# Patient Record
Sex: FEMALE | Race: BLACK OR AFRICAN AMERICAN | Employment: FULL TIME | URBAN - METROPOLITAN AREA
[De-identification: names, ages, dates, MRNs, and addresses within clinical notes are randomized per-mention and may not be internally consistent; named-entity substitution may affect disease eponyms.]

---

## 2017-01-11 RX ORDER — LISINOPRIL AND HYDROCHLOROTHIAZIDE 12.5; 2 MG/1; MG/1
TABLET ORAL
Qty: 90 TAB | Refills: 1 | Status: SHIPPED | OUTPATIENT
Start: 2017-01-11 | End: 2021-01-08

## 2017-01-25 DIAGNOSIS — E03.9 ACQUIRED HYPOTHYROIDISM: ICD-10-CM

## 2017-02-10 RX ORDER — ESTROGENS, CONJUGATED 0.62 MG/1
TABLET, FILM COATED ORAL
Qty: 30 TAB | Refills: 0 | Status: SHIPPED | OUTPATIENT
Start: 2017-02-10 | End: 2017-03-11 | Stop reason: SDUPTHER

## 2017-03-01 RX ORDER — CARVEDILOL PHOSPHATE 40 MG/1
CAPSULE, EXTENDED RELEASE ORAL
Qty: 30 CAP | Refills: 4 | Status: SHIPPED | OUTPATIENT
Start: 2017-03-01 | End: 2017-08-09 | Stop reason: SDUPTHER

## 2017-03-16 ENCOUNTER — OFFICE VISIT (OUTPATIENT)
Dept: INTERNAL MEDICINE CLINIC | Age: 57
End: 2017-03-16

## 2017-03-16 VITALS
SYSTOLIC BLOOD PRESSURE: 108 MMHG | TEMPERATURE: 97.8 F | HEART RATE: 64 BPM | DIASTOLIC BLOOD PRESSURE: 60 MMHG | HEIGHT: 63 IN | WEIGHT: 219 LBS | BODY MASS INDEX: 38.8 KG/M2 | OXYGEN SATURATION: 96 %

## 2017-03-16 DIAGNOSIS — J40 BRONCHITIS: Primary | ICD-10-CM

## 2017-03-16 RX ORDER — AZITHROMYCIN 250 MG/1
TABLET, FILM COATED ORAL
Qty: 6 TAB | Refills: 0 | Status: SHIPPED | OUTPATIENT
Start: 2017-03-16 | End: 2017-04-18 | Stop reason: ALTCHOICE

## 2017-03-16 RX ORDER — PROMETHAZINE HYDROCHLORIDE AND CODEINE PHOSPHATE 6.25; 1 MG/5ML; MG/5ML
SOLUTION ORAL
Qty: 120 ML | Refills: 0 | Status: SHIPPED | OUTPATIENT
Start: 2017-03-16 | End: 2018-05-17

## 2017-03-16 RX ORDER — HYDROCODONE POLISTIREX AND CHLORPHENIRAMINE POLISTIREX 10; 8 MG/5ML; MG/5ML
5 SUSPENSION, EXTENDED RELEASE ORAL
Qty: 120 ML | Refills: 0 | Status: SHIPPED | OUTPATIENT
Start: 2017-03-16 | End: 2017-04-18 | Stop reason: ALTCHOICE

## 2017-03-16 NOTE — PROGRESS NOTES
1. Have you been to the ER, urgent care clinic or hospitalized since your last visit? NO.     2. Have you seen or consulted any other health care providers outside of the [address] [place] [address] since your last visit (Include any pap smears or colon screening)? NO      Do you have an Advanced Directive? NO    Would you like information on Advanced Directives?  YES

## 2017-03-16 NOTE — PROGRESS NOTES
62 y.o. BLACK OR  female who presents for evaluation. She's had uri sx about a week, everyone around her especially at work has been sick w uri sx. Describes chest congestion, minimally prod cough, minimal wheezing without overt sob. No fever, ear pain, sore throat, facial pain. Been using ricola and herbal tea w minimal help    Current Outpatient Prescriptions on File Prior to Visit   Medication Sig Dispense Refill    PREMARIN 0.625 mg tablet take 1 tablet by mouth once daily 90 Tab 3    COREG CR 40 mg CR capsule TAKE 1 CAPSULES DAILY WITH BREAKFAST 30 Cap 4    lisinopril-hydroCHLOROthiazide (PRINZIDE, ZESTORETIC) 20-12.5 mg per tablet take 1 tablet by mouth every morning for blood pressure 90 Tab 1    amLODIPine (NORVASC) 10 mg tablet take 1 tablet by mouth once daily 90 Tab 3    sertraline (ZOLOFT) 100 mg tablet take 1 tablet by mouth daily 30 Tab 10    levothyroxine (SYNTHROID) 100 mcg tablet Take 100 mcg by mouth Daily (before breakfast).  Cholecalciferol, Vitamin D3, (VITAMIN D) 5,000 unit Tab Take  by mouth. No current facility-administered medications on file prior to visit. Current Outpatient Prescriptions   Medication Sig    chlorpheniramine-HYDROcodone (TUSSIONEX) 10-8 mg/5 mL suspension Take 5 mL by mouth every twelve (12) hours as needed for Cough. Max Daily Amount: 10 mL.  azithromycin (ZITHROMAX) 250 mg tablet Take 2 tablets today, then take 1 tablet daily    PREMARIN 0.625 mg tablet take 1 tablet by mouth once daily    COREG CR 40 mg CR capsule TAKE 1 CAPSULES DAILY WITH BREAKFAST    lisinopril-hydroCHLOROthiazide (PRINZIDE, ZESTORETIC) 20-12.5 mg per tablet take 1 tablet by mouth every morning for blood pressure    amLODIPine (NORVASC) 10 mg tablet take 1 tablet by mouth once daily    sertraline (ZOLOFT) 100 mg tablet take 1 tablet by mouth daily    levothyroxine (SYNTHROID) 100 mcg tablet Take 100 mcg by mouth Daily (before breakfast).     Cholecalciferol, Vitamin D3, (VITAMIN D) 5,000 unit Tab Take  by mouth. No current facility-administered medications for this visit. No Known Allergies  Visit Vitals    /60    Pulse 64    Temp 97.8 °F (36.6 °C) (Oral)    Ht 5' 3\" (1.6 m)    Wt 219 lb (99.3 kg)    SpO2 96%    BMI 38.79 kg/m2   tm wnl, sinuses nt, op benign, no nodes. Lungs dec bs but clear, no wheezing or rales, heart showed rrr. Assessment and plan:  1. Bronchitis. zpak and tussionex given, she will use coricidin, call if no better or worsening complaints      Above conditions discussed at length and patient vocalized understanding.   All questions answered to patient satifaction

## 2017-04-17 ENCOUNTER — TELEPHONE (OUTPATIENT)
Dept: VASCULAR SURGERY | Age: 57
End: 2017-04-17

## 2017-04-17 NOTE — TELEPHONE ENCOUNTER
Pt cld, she is having trouble with her left leg swelling and wants to come in for a visit but if she needs tests she wants to go ahead and do that first.  Can we order test first, she is a current patient.

## 2017-04-18 ENCOUNTER — OFFICE VISIT (OUTPATIENT)
Dept: INTERNAL MEDICINE CLINIC | Age: 57
End: 2017-04-18

## 2017-04-18 VITALS
DIASTOLIC BLOOD PRESSURE: 66 MMHG | BODY MASS INDEX: 38.45 KG/M2 | WEIGHT: 217 LBS | OXYGEN SATURATION: 99 % | SYSTOLIC BLOOD PRESSURE: 110 MMHG | HEIGHT: 63 IN | HEART RATE: 68 BPM | TEMPERATURE: 98.3 F

## 2017-04-18 DIAGNOSIS — K62.89 RECTAL PAIN: Primary | ICD-10-CM

## 2017-04-18 DIAGNOSIS — I10 ESSENTIAL HYPERTENSION: ICD-10-CM

## 2017-04-18 PROBLEM — E66.9 OBESITY (BMI 30-39.9): Status: ACTIVE | Noted: 2017-04-18

## 2017-04-19 NOTE — PROGRESS NOTES
62 y.o. BLACK OR  female who presents for evaluation. No cardiovascular complaints. Remains active with her exercise and bp has been controlled    Here primarily c/o perirectal pain which she describes as almost a spasm. Interestingly, it only happens at night. Been ongoing about 5 mos or so, occurs maybe 1-2x/month. No associated n/v/change in bh, bleeding, she feels that it's inside the rectum. BMs do not help, they usually resolve within an hour or so. She had colo possibly 2010 from Dr Shobha Valencia group which was negative. Reports no associated gu or gyn complaints, she is s/p total hyst and has not seen gyn in several years    Past Medical History:   Diagnosis Date    Allergic rhinitis     Anxiety     WADE-7 was 6/21    Chronic venous hypertension without complications 5/45/4113    Depression     1980s treated w zoloft    FHx: heart disease     Grave's disease 2003    and hyperthyroidism Dr. Trent Lawson Hypertension     neg w/u secondary causes 3/15 Dr Christelle Patel    Hypovitaminosis D     Morbid obesity ([address]     peak weight 225 lbs, bmi 39.1 from 2/15    Subacromial bursitis     Venous reflux     12/14 EMILY Carrizales     Past Surgical History:   Procedure Laterality Date    CARDIAC SURG PROCEDURE UNLIST  1/14    negative thallium ef 65%    HX COLONOSCOPY      negative Dr Tenzin Singh 2012?     HX CHADWICK AND BSO      NEUROLOGICAL PROCEDURE UNLISTED  3/15    ct head negative    VASCULAR SURGERY PROCEDURE UNLIST  2/14    negative BLE venous doppler     Social History     Social History    Marital status:      Spouse name: N/A    Number of children: 3    Years of education: N/A     Occupational History    works at [employer] [address] History Main Topics    Smoking status: Never Smoker    Smokeless tobacco: Never Used    Alcohol use No    Drug use: No    Sexual activity: Not on file     Other Topics Concern    Not on file     Social History Narrative      Current Outpatient Prescriptions Medication Sig    promethazine-codeine (PHENERGAN WITH CODEINE) 6.25-10 mg/5 mL syrup TAKE 5 MLS BY MOUTH FOUR TIMES A DAY    PREMARIN 0.625 mg tablet take 1 tablet by mouth once daily    COREG CR 40 mg CR capsule TAKE 1 CAPSULES DAILY WITH BREAKFAST    lisinopril-hydroCHLOROthiazide (PRINZIDE, ZESTORETIC) 20-12.5 mg per tablet take 1 tablet by mouth every morning for blood pressure    amLODIPine (NORVASC) 10 mg tablet take 1 tablet by mouth once daily    sertraline (ZOLOFT) 100 mg tablet take 1 tablet by mouth daily    Cholecalciferol, Vitamin D3, (VITAMIN D) 5,000 unit Tab Take  by mouth. No current facility-administered medications for this visit. No Known Allergies    REVIEW OF SYSTEMS: gyn 2015, Mammo 2011? [place] 2011? Dr Justin Patel  Ophtho - no vision change or eye pain  Oral - no mouth pain, tongue or tooth problems  Ears - no hearing loss, ear pain, fullness, no swallowing problems  Resp - no wheezing, chronic coughing, dyspnea  GI - no heartburn, nausea, vomiting, change in bowel habits, bleeding, hemorrhoids  Urinary - no dysuria, hematuria, flank pain, urgency, frequency  Ortho - no swelling, dec ROM, myalgias  Derm - no nail abnormalities, rashes, lesions of note, hair loss  Psych - denies any anxiety or depression symptoms, no hallucinations or violent ideation  Constitutional - no wt loss, night sweats, unexplained fevers  Neuro - no focal weakness, numbness, paresthesias, incoordination, ataxia, involuntary movements  Endo - no polyuria, polydipsia, nocturia, hot flashes    Visit Vitals    /66    Pulse 68    Temp 98.3 °F (36.8 °C) (Oral)    Ht 5' 3\" (1.6 m)    Wt 217 lb (98.4 kg)    SpO2 99%    BMI 38.44 kg/m2   A&O x3  Affect is appropriate. Mood stable  No apparent distress  Anicteric, no JVD, adenopathy or thyromegaly. No carotid bruits or radiated murmur  Lungs clear to auscultation, no wheezes or rales  Heart showed regular rate and rhythm.  No murmur, rubs, gallops  Abdomen soft nontender, no hepatosplenomegaly or masses. Declined rectal exam as she will be seeing the specialist  Extremities without edema. Pulses 1-2+ symmetrically. LABS  From 11/07 showed gluc 92, cr 0.90,  alt 30, chol 164, tg 30, hdl 51, ldl-c 107, wbc 4.5, hb 12.7, plt 190, tsh 0.50  From 2/10 showed                    wbc 4.7, hb 13.7, plt 194  From 6/10 showed   gluc 88, cr 0.80,   alt 37, chol 168, tg 40, hdl 65, ldl-c 95  From 8/10 showed                                 tsh 0.43, vit d 27  From 3/12 showed                          ua neg  From 10/14 showed                          uric 6.8  From 3/15 showed   gluc 90, cr 0.80, gfr>60, alt 14, chol 174, tg 41, hdl 72, ldl-c 94,  wbc 4.4, hb 11.4, plt 218, tsh 0.27  From 3/15 showed                ck/trop neg  From 3/15 showed   gluc 99, cr 0.70, gfr>60, alt 12,           meta neg, luis/renin nl, serum catechols neg, urine catechols neg  From 12/15 showed gluc 91, cr 0.70, gfr>60, alt 18, chol 176, tg 45, hdl 72, ldl-c 96,  wbc 4.5, hb 12.9, plt 201, tsh 0.38, ua neg    Patient Active Problem List   Diagnosis Code    Varicose veins of lower extremities with other complications E19.290    Anxiety F41.9    Essential hypertension I10    Leg edema, left R60.0    Acquired hypothyroidism E03.9    Morbid obesity with BMI of 40.0-44.9, adult (HCC) E66.01, Z68.41     Assessment and plan:  1. HTN. Continue current  2. Thyroid. F/U Dr Suni Winslow   3. Varicosities. F/U EMILY Lopez  4. Psychiatric. Continue current regimen. 5. Perirectal pain/spasm? Will sched Dr Tru Petit for eval        RTC 1/17    Above conditions discussed at length and patient vocalized understanding.   All questions answered to patient satifaction

## 2017-05-02 ENCOUNTER — OFFICE VISIT (OUTPATIENT)
Dept: VASCULAR SURGERY | Age: 57
End: 2017-05-02

## 2017-05-02 VITALS
HEART RATE: 68 BPM | BODY MASS INDEX: 38.45 KG/M2 | WEIGHT: 217 LBS | DIASTOLIC BLOOD PRESSURE: 70 MMHG | SYSTOLIC BLOOD PRESSURE: 118 MMHG | RESPIRATION RATE: 18 BRPM | HEIGHT: 63 IN

## 2017-05-02 DIAGNOSIS — I83.893 VARICOSE VEINS OF LOWER EXTREMITIES WITH OTHER COMPLICATIONS: ICD-10-CM

## 2017-05-02 DIAGNOSIS — R60.0 LEG EDEMA, LEFT: ICD-10-CM

## 2017-05-02 DIAGNOSIS — R60.0 LEG EDEMA, LEFT: Primary | ICD-10-CM

## 2017-05-02 NOTE — PROCEDURES
[facility]   *** FINAL REPORT ***    Name: Yo Osorio  MRN: [id number]       Outpatient  : 08 Mar 1960  HIS Order #: [id number]  [id number] [facility] Visit #: [id number]  Date: 02 May 2017    TYPE OF TEST: Peripheral Venous Testing    REASON FOR TEST  Pain in limb, Limb swelling    Right Leg:-  Deep venous thrombosis:           Not examined  Superficial venous thrombosis:    Not examined  Deep venous insufficiency:        Not examined  Superficial venous insufficiency: Not examined    Left Leg:-  Deep venous thrombosis:           No  Superficial venous thrombosis:    No  Deep venous insufficiency:        No  Superficial venous insufficiency: No    Vein Mapping:    Diam.   Depth  (mm)    Right Great Saphenous Vein:    High Thigh:    Mid Thigh:    Low Thigh:    Knee:    High Calf:    Low Calf: Ankle:    Right Small Saphenous Vein:    SPJ:    Mid Calf: Ankle:    Giacomini:  Accessory saph.:  Maier :  Muse :    Left Great Saphenous Vein:    High Thigh:    Mid Thigh:    Low Thigh:    Knee:    High Calf:    Low Calf: Ankle:    Left Small Saphenous Vein:    SPJ:    Mid Calf: Ankle:    Giacomini:  Accessory saph.:  Maier :  Muse :      INTERPRETATION/FINDINGS  Duplex images were obtained using 2-D gray scale, color flow and  spectral doppler analysis. 1. No evidence of deep venous thrombosis in the left common femoral,  femoral, profunda, popliteal, posterior tibial and peroneal veins in  the lower extremity. 2. Patent contralateral femoral vein without evidence of thrombus. 3. History of left GSV ablation on 02/24/15, unable to identify main  GSV in the left thigh and knee levels, T0. No significant superficial   venous reflux noted in the remaining levels of the left GSV. No  reflux detected in the left AASV in reverse trendelenburg. 4. No reflux detected in the left sapheno-popliteal junction of the  left lesser saphenous vein in reverse trendelenburg.   No reflux  detected in the remaining levels of the left LSV. 5. Competent left lower leg  noted measuring 1.9 mm.  6. Triphasic doppler signals in the tibial vessel at rest.  No significant changes when compare to the previous exam.    ADDITIONAL COMMENTS    I have personally reviewed the data relevant to the interpretation of  this  study. TECHNOLOGIST: Ishmael Romero RVT, KHADIJAH  Signed: 05/02/2017 11:39 AM    PHYSICIAN: Pranav Stein D.O.   Signed: 05/02/2017 01:19 PM

## 2017-05-02 NOTE — MR AVS SNAPSHOT
Visit Information Date & Time Provider Department Dept. Phone Encounter #  
 5/2/2017  9:00 AM Oracio Champagne MD [facility] [phone] [id number] Follow-up Instructions Return in about 2 days (around 5/4/2017). Follow-up and Disposition History Your Appointments 5/2/2017 11:00 AM  
PROCEDURE with [facility] IMAGING 2 [facility] ([facility]) Appt Note: l reflux juan same day [address] [facility] [address] [facility]  
[phone] [address] [facility] [address] [facility]  
  
    
 5/4/2017  1:45 PM  
[facility] [facility] Appt Note: NP  
 [address] [place] [address]  
[address] [facility] [address] Upcoming Health Maintenance Date Due Hepatitis C Screening 1960 DTaP/Tdap/Td series (1 - Tdap) 3/8/1981 BREAST CANCER SCRN MAMMOGRAM 3/8/2010 INFLUENZA AGE 9 TO ADULT 8/1/2017 COLONOSCOPY 7/2/2022 Allergies as of 5/2/2017  Review Complete On: 5/2/2017 By: Oracio Champagne MD  
 No Known Allergies Current Immunizations  Reviewed on 7/12/2013 No immunizations on file. Not reviewed this visit You Were Diagnosed With   
  
 Codes Comments Leg edema, left    -  Primary ICD-10-CM: R60.0 ICD-9-CM: 465. 3 Varicose veins of lower extremities with other complications     IQM-11-IR: P19.875 ICD-9-CM: 454.8 Vitals BP Pulse Resp Height(growth percentile) Weight(growth percentile) BMI  
 118/70 (BP 1 Location: Right arm, BP Patient Position: Sitting) 68 18 5' 3\" (1.6 m) 217 lb (98.4 kg) 38.44 kg/m2 OB Status Smoking Status Hysterectomy Never Smoker BMI and BSA Data  Body Mass Index Body Surface Area  
 38.44 kg/m 2 2.09 m 2  
  
  
 Preferred Pharmacy Pharmacy Name Phone [id number], [address] [phone] Your Updated Medication List  
  
   
This list is accurate as of: 5/2/17  9:37 AM.  Always use your most recent med list. amLODIPine 10 mg tablet Commonly known as:  NORVASC  
take 1 tablet by mouth once daily COREG CR 40 mg CR capsule Generic drug:  carvedilol TAKE 1 CAPSULES DAILY WITH BREAKFAST  
  
 lisinopril-hydroCHLOROthiazide 20-12.5 mg per tablet Commonly known as:  PRINZIDE, ZESTORETIC  
take 1 tablet by mouth every morning for blood pressure PREMARIN 0.625 mg tablet Generic drug:  conjugated estrogens  
take 1 tablet by mouth once daily  
  
 promethazine-codeine 6.25-10 mg/5 mL syrup Commonly known as:  PHENERGAN with CODEINE  
TAKE 5 MLS BY MOUTH FOUR TIMES A DAY  
  
 sertraline 100 mg tablet Commonly known as:  ZOLOFT  
take 1 tablet by mouth daily VITAMIN D 5,000 unit Tab tablet Generic drug:  cholecalciferol (VITAMIN D3) Take  by mouth. Follow-up Instructions Return in about 2 days (around 5/4/2017). To-Do List   
 05/05/2017 Imaging:  DUPLEX LOWER EXT VENOUS LEFT AMB \[facility] [place] [facility]\"" & HEALTH SERVICES! Dear Jaycee Valente: Thank you for requesting a [employer] account. Our records indicate that you already have an active [employer] account. You can access your account anytime at [email or web address] Did you know that you can access your hospital and ER discharge instructions at any time in [employer]? You can also review all of your test results from your hospital stay or ER visit. Additional Information If you have questions, please visit the Frequently Asked Questions section of the [employer] website at [email or web address] Remember, [employer] is NOT to be used for urgent needs. For medical emergencies, dial 911. Now available from your iPhone and Android! Please provide this summary of care documentation to your next provider. Your primary care clinician is listed as Nargis Cabral. If you have any questions after today's visit, please call [phone].

## 2017-05-02 NOTE — PROGRESS NOTES
Lupis Alvarez    Chief Complaint   Patient presents with    Swelling    Varicose Veins       History and Physical    68-year-old female following up today regarding painful edema in her left leg. She has had a left leg greater saphenous closure was she was happy with her results. He recently she been having some pain behind the knee and in the inner calf area left leg and some swelling and some skin discoloration. She wears her stockings intermittently she is more comfortable with the knee-high now and she is also worn a knee sleeve. She had no chest pain no shortness of breath no other symptoms. No skin ulceration no skin weeping. No fevers or chills    Past Medical History:   Diagnosis Date    Allergic rhinitis     Anxiety     WADE-7 was 6/21    Chronic venous hypertension without complications 3/84/6964    Depression     1980s treated w zoloft    FHx: heart disease     Grave's disease 2003    and hyperthyroidism Dr. Roa Gut Hypertension     neg w/u secondary causes 3/15 Dr Paty Ace Hypovitaminosis D     Morbid obesity ([facility] [address]     peak weight 225 lbs, bmi 39.1 from 2/15    Subacromial bursitis     Venous reflux     12/14 EMILY Lopez     Patient Active Problem List   Diagnosis Code    Varicose veins of lower extremities with other complications F65.270    Anxiety F41.9    Essential hypertension I10    Leg edema, left R60.0    Acquired hypothyroidism E03.9    Obesity (BMI 30-39. 9) E66.9     Past Surgical History:   Procedure Laterality Date    CARDIAC SURG PROCEDURE UNLIST  1/14    negative thallium ef 65%    HX COLONOSCOPY      Dr Manuel Humphries 7/3/12 negative    HX CHADWICK AND BSO      NEUROLOGICAL PROCEDURE UNLISTED  3/15    ct head negative    VASCULAR SURGERY PROCEDURE UNLIST  2/14    negative BLE venous doppler     Current Outpatient Prescriptions   Medication Sig Dispense Refill    promethazine-codeine (PHENERGAN WITH CODEINE) 6.25-10 mg/5 mL syrup TAKE 5 MLS BY MOUTH FOUR TIMES A  mL 0    PREMARIN 0.625 mg tablet take 1 tablet by mouth once daily 90 Tab 3    COREG CR 40 mg CR capsule TAKE 1 CAPSULES DAILY WITH BREAKFAST 30 Cap 4    lisinopril-hydroCHLOROthiazide (PRINZIDE, ZESTORETIC) 20-12.5 mg per tablet take 1 tablet by mouth every morning for blood pressure 90 Tab 1    amLODIPine (NORVASC) 10 mg tablet take 1 tablet by mouth once daily 90 Tab 3    sertraline (ZOLOFT) 100 mg tablet take 1 tablet by mouth daily 30 Tab 10    Cholecalciferol, Vitamin D3, (VITAMIN D) 5,000 unit Tab Take  by mouth. No Known Allergies    Review of Systems    A full review of systems was completed times ten organ systems and was deemed negative unless otherwise mentioned in the HPI. Physical   Visit Vitals    /70 (BP 1 Location: Right arm, BP Patient Position: Sitting)    Pulse 68    Resp 18    Ht 5' 3\" (1.6 m)    Wt 217 lb (98.4 kg)    BMI 38.44 kg/m2       Healthy-appearing 57 youthful  No facial symmetry pupils are reactive  Hearing speech vision intact  Neck no JVD  Chest clear  Cardiac regular  Abdomen soft nondistended  Lower extremity mild edema today but she said this is on again off again gets worse throughout the day  No signs of arterial insufficiency  No skin ulcerations  Reviewed CAT scan images I do not see any mass that would be compressing on the iliac vein appears appropriate and posterior  Last ultrasound shows ablated saphenous with no dilated varicose veins  No DVT     Impression/Plan:     ICD-10-CM ICD-9-CM    1. Leg edema, left R60.0 782.3 DUPLEX LOWER EXT VENOUS LEFT AMB   2.  Varicose veins of lower extremities with other complications W85.605 183.3 DUPLEX LOWER EXT VENOUS LEFT AMB   plan for ultrasound to evaluate the branches of the saphenous below the closure site and behind the knee  Would like to rule out DVT and also any dilated varicosities  She will continue with her stockings  Orders Placed This Encounter    DUPLEX LOWER EXT VENOUS LEFT AMB (Reflux) Follow-up Disposition:  Return in about 2 days (around 5/4/2017). Jadyn Riggins MD    PLEASE NOTE:  This document has been produced using voice recognition software. Unrecognized errors in transcription may be present.

## 2017-05-04 ENCOUNTER — OFFICE VISIT (OUTPATIENT)
Dept: SURGERY | Age: 57
End: 2017-05-04

## 2017-05-04 VITALS
BODY MASS INDEX: 37.74 KG/M2 | WEIGHT: 213 LBS | DIASTOLIC BLOOD PRESSURE: 82 MMHG | HEART RATE: 67 BPM | RESPIRATION RATE: 18 BRPM | SYSTOLIC BLOOD PRESSURE: 126 MMHG | HEIGHT: 63 IN | TEMPERATURE: 98.1 F

## 2017-05-04 DIAGNOSIS — K59.4 PROCTALGIA FUGAX: ICD-10-CM

## 2017-05-04 DIAGNOSIS — K62.89 ANAL OR RECTAL PAIN: Primary | ICD-10-CM

## 2017-05-04 NOTE — PROGRESS NOTES
HPI: Nichelle Jules is a 62 y.o. female presenting with chief complain of anorectal pain. This is been going on for the last 2 months. It occurs at night and wakes her from sleep. It lasts 20-25 minutes and quite severe. But it resolves. She denies a feeling of tissue protrusion. It is not associated with bowel movements. She denies abdominal pain although has some excessive gas. She denies any recent unexplained weight loss. She has 1-2 bowel movements per day and denies constipation or incontinence. She has occasional urgency. Her last colonoscopy was in  by Dr. Jillian Roman and was negative. She is a  with history of tear. She had one spontaneous vaginal delivery and 2 C-sections.     Past Medical History:   Diagnosis Date    Allergic rhinitis     Anxiety     WADE-7 was     Chronic venous hypertension without complications 3/33/4192    Depression     1980s treated w zoloft    FHx: heart disease     Grave's disease     and hyperthyroidism Dr. Yadira Quintero Hypertension     neg w/u secondary causes 3/15 Dr Felipe Selby Hypovitaminosis D     Morbid obesity ([facility] [address]     peak weight 225 lbs, bmi 39.1 from 2/15    Subacromial bursitis     Venous reflux      EMILY Parsons       Past Surgical History:   Procedure Laterality Date    CARDIAC SURG PROCEDURE UNLIST      negative thallium ef 65%    HX COLONOSCOPY      Dr Jillian Roman 7/3/12 negative    HX CHADWICK AND BSO      NEUROLOGICAL PROCEDURE UNLISTED  3/15    ct head negative    VASCULAR SURGERY PROCEDURE UNLIST      negative BLE venous doppler       Family History   Problem Relation Age of Onset    Heart Disease Father     Hypertension Sister     Heart Disease Mother     Seizures Sister     Sudden Death Neg Hx     Heart Attack Neg Hx        Social History     Social History    Marital status:      Spouse name: N/A    Number of children: 3    Years of education: N/A     Occupational History    works at [employer] History Main Topics    Smoking status: Never Smoker    Smokeless tobacco: Never Used    Alcohol use No    Drug use: No    Sexual activity: Not Asked     Other Topics Concern    None     Social History Narrative       Review of Systems - Review of Systems   Constitutional: Negative. HENT: Negative. Eyes: Negative. Respiratory: Negative. Cardiovascular: Positive for chest pain. Negative for palpitations, orthopnea, claudication, leg swelling and PND. Gastrointestinal: Negative. Genitourinary: Negative. Musculoskeletal: Negative. Skin: Negative. Neurological: Negative. Endo/Heme/Allergies: Negative. Psychiatric/Behavioral: Negative. Outpatient Prescriptions Marked as Taking for the 5/4/17 encounter (Office Visit) with Donna Spencer MD   Medication Sig Dispense Refill    PREMARIN 0.625 mg tablet take 1 tablet by mouth once daily 90 Tab 3    COREG CR 40 mg CR capsule TAKE 1 CAPSULES DAILY WITH BREAKFAST 30 Cap 4    lisinopril-hydroCHLOROthiazide (PRINZIDE, ZESTORETIC) 20-12.5 mg per tablet take 1 tablet by mouth every morning for blood pressure 90 Tab 1    amLODIPine (NORVASC) 10 mg tablet take 1 tablet by mouth once daily 90 Tab 3    sertraline (ZOLOFT) 100 mg tablet take 1 tablet by mouth daily 30 Tab 10    Cholecalciferol, Vitamin D3, (VITAMIN D) 5,000 unit Tab Take  by mouth. No Known Allergies    Vitals:    05/04/17 1426   BP: 126/82   Pulse: 67   Resp: 18   Temp: 98.1 °F (36.7 °C)   Weight: 96.6 kg (213 lb)   Height: 5' 3\" (1.6 m)   PainSc:   0 - No pain       Physical Exam   Constitutional: She appears well-developed and well-nourished. HENT:   Head: Normocephalic and atraumatic. Eyes: Conjunctivae and EOM are normal.   Abdominal: Soft. She exhibits no distension. There is no tenderness. Musculoskeletal: Normal range of motion. Lymphadenopathy:     She has no cervical adenopathy. Right: No inguinal adenopathy present.         Left: No inguinal adenopathy present. Neurological: She exhibits normal muscle tone. Skin: Skin is warm and dry. No rash noted. Psychiatric: She has a normal mood and affect. Her speech is normal.    rectum: No significant external hemorrhoids, fissure, abscess  Digital rectal exam: Good tone, no mass, no point tenderness along the levator muscle  Anoscopy: No significant internal hemorrhoids    Assessment / Plan    Anal rectal pain, proctalgia fugax by history  As is been 5 years since her last colonoscopy would recommend she discuss colonoscopy or at least flexible sigmoidoscopy with Dr. Tenzin Singh  Nifedipine ointment and sitz baths for discomfort  Follow-up in 3 weeks    The diagnoses and plan were discussed with the patient. All questions answered. Plan of care agreed to by all concerned.

## 2017-05-04 NOTE — LETTER
5/4/2017 2:58 PM 
 
Patient:  Robles Bynum YOB: 1960 Date of Visit: 5/4/2017 Redge Landau, MD 
[place] [address] [address] [id number] [address] [address] VIA In Basket Osito Londono MD 
[address] [place] [address] Drive [address] [id number] [address] [address] VIA Facsimile: [phone] Dear Ross Washington, 
 
I saw Ervin Fuentes in the office today for anorectal pain which has been occurring over the last 2 months. It typically will wake her from sleep and last approximately 20 minutes. Her exam including anoscopy is normal.  By history this sounds like proctalgia fugax I will manage her with sitz baths and nifedipine ointment as necessary. This may be a self-limited issue. Her last colonoscopy was 5 years ago by Dr. Juliocesar Corbett, so I think that it would be a good idea to perform a flexible sigmoidoscopy or a repeat colonoscopy as well. She will contact him regarding this. Thank you very much for your referral of Ms. Jolene Palacios. If you have questions, please do not hesitate to call me. I look forward to following Ms. Monica Garner along with you and will keep you updated as to her progress. Sincerely, Jeremias Castellon MD

## 2017-05-04 NOTE — MR AVS SNAPSHOT
Visit Information Date & Time Provider Department Dept. Phone Encounter #  
 5/4/2017  1:45 PM Claudette Cullen MD [facility] [id number] Follow-up Instructions Return in about 3 weeks (around 5/25/2017). Upcoming Health Maintenance Date Due Hepatitis C Screening 1960 DTaP/Tdap/Td series (1 - Tdap) 3/8/1981 BREAST CANCER SCRN MAMMOGRAM 3/8/2010 INFLUENZA AGE 9 TO ADULT 8/1/2017 COLONOSCOPY 7/3/2022 Allergies as of 5/4/2017  Review Complete On: 5/4/2017 By: Claudette Cullen MD  
 No Known Allergies Current Immunizations  Reviewed on 7/12/2013 No immunizations on file. Not reviewed this visit You Were Diagnosed With   
  
 Codes Comments Anal or rectal pain    -  Primary ICD-10-CM: K62.89 ICD-9-CM: 674.64 Proctalgia fugax     ICD-10-CM: K59.4 ICD-9-CM: 564.6 Vitals BP Pulse Temp Resp Height(growth percentile) Weight(growth percentile) 126/82 67 98.1 °F (36.7 °C) 18 5' 3\" (1.6 m) 213 lb (96.6 kg) BMI OB Status Smoking Status 37.73 kg/m2 Hysterectomy Never Smoker BMI and BSA Data Body Mass Index Body Surface Area  
 37.73 kg/m 2 2.07 m 2 Preferred Pharmacy Pharmacy Name Phone [facility] [address] [phone] Your Updated Medication List  
  
   
This list is accurate as of: 5/4/17  2:59 PM.  Always use your most recent med list. amLODIPine 10 mg tablet Commonly known as:  NORVASC  
take 1 tablet by mouth once daily COREG CR 40 mg CR capsule Generic drug:  carvedilol TAKE 1 CAPSULES DAILY WITH BREAKFAST  
  
 lisinopril-hydroCHLOROthiazide 20-12.5 mg per tablet Commonly known as:  PRINZIDE, ZESTORETIC  
take 1 tablet by mouth every morning for blood pressure PREMARIN 0.625 mg tablet Generic drug:  conjugated estrogens take 1 tablet by mouth once daily  
  
 promethazine-codeine 6.25-10 mg/5 mL syrup Commonly known as:  PHENERGAN with CODEINE  
TAKE 5 MLS BY MOUTH FOUR TIMES A DAY RX AMB NIFEDIPINE 0.2 % RECTAL OINTMENT COMPOUND  
by PeriANAL route three (3) times daily for 45 days. In 30 gm petrolatum  
  
 sertraline 100 mg tablet Commonly known as:  ZOLOFT  
take 1 tablet by mouth daily VITAMIN D 5,000 unit Tab tablet Generic drug:  cholecalciferol (VITAMIN D3) Take  by mouth. Prescriptions Printed Refills RX AMB NIFEDIPINE 0.2 % RECTAL OINTMENT COMPOUND 3 Sig: by PeriANAL route three (3) times daily for 45 days. In 30 gm petrolatum Class: Print Route: PeriANAL We Performed the Following AR DIAGNOSTIC ANOSCOPY [id number] CPT(R)] Follow-up Instructions Return in about 3 weeks (around 5/25/2017). Introducing [facility]! Dear Ricky Rosenbaum: Thank you for requesting a "[employer] account. Our records indicate that you already have an active "[employer] account. You can access your account anytime at [email or web address] App Store/Re[facility] Management Services Did you know that you can access your hospital and ER discharge instructions at any time in "[employer]? You can also review all of your test results from your hospital stay or ER visit. Additional Information If you have questions, please visit the Frequently Asked Questions section of the "[employer] website at [email or web address] [employer] Store/Rehab Management [email or web address]/. Remember, "[employer] is NOT to be used for urgent needs. For medical emergencies, dial 911. Now available from your iPhone and Android! Please provide this summary of care documentation to your next provider. Your primary care clinician is listed as Naren Shi. If you have any questions after today's visit, please call [phone].

## 2017-06-12 ENCOUNTER — DOCUMENTATION ONLY (OUTPATIENT)
Dept: INTERNAL MEDICINE CLINIC | Age: 57
End: 2017-06-12

## 2017-07-10 RX ORDER — SERTRALINE HYDROCHLORIDE 100 MG/1
TABLET, FILM COATED ORAL
Qty: 30 TAB | Refills: 10 | Status: SHIPPED | OUTPATIENT
Start: 2017-07-10 | End: 2018-05-17

## 2017-07-13 DIAGNOSIS — Z12.31 SCREENING MAMMOGRAM, ENCOUNTER FOR: Primary | ICD-10-CM

## 2017-07-13 PROBLEM — K63.5 HYPERPLASTIC COLONIC POLYP: Status: ACTIVE | Noted: 2017-07-13

## 2017-08-10 RX ORDER — CARVEDILOL PHOSPHATE 40 MG/1
CAPSULE, EXTENDED RELEASE ORAL
Qty: 30 CAP | Refills: 4 | Status: SHIPPED | OUTPATIENT
Start: 2017-08-10 | End: 2018-12-06 | Stop reason: SDUPTHER

## 2017-08-10 RX ORDER — AMLODIPINE BESYLATE 10 MG/1
TABLET ORAL
Qty: 90 TAB | Refills: 3 | Status: SHIPPED | OUTPATIENT
Start: 2017-08-10 | End: 2018-10-29 | Stop reason: SDUPTHER

## 2017-08-10 RX ORDER — CARVEDILOL PHOSPHATE 40 MG/1
CAPSULE, EXTENDED RELEASE ORAL
Qty: 30 CAP | Refills: 4 | Status: SHIPPED | OUTPATIENT
Start: 2017-08-10 | End: 2017-11-20 | Stop reason: SDUPTHER

## 2017-11-20 ENCOUNTER — OFFICE VISIT (OUTPATIENT)
Dept: INTERNAL MEDICINE CLINIC | Age: 57
End: 2017-11-20

## 2017-11-20 VITALS
RESPIRATION RATE: 14 BRPM | HEART RATE: 63 BPM | HEIGHT: 63 IN | SYSTOLIC BLOOD PRESSURE: 106 MMHG | WEIGHT: 208.6 LBS | DIASTOLIC BLOOD PRESSURE: 76 MMHG | TEMPERATURE: 98.2 F | BODY MASS INDEX: 36.96 KG/M2 | OXYGEN SATURATION: 98 %

## 2017-11-20 DIAGNOSIS — M79.672 LEFT FOOT PAIN: Primary | ICD-10-CM

## 2017-11-20 DIAGNOSIS — M77.9 TENDONITIS: ICD-10-CM

## 2017-11-20 RX ORDER — MELOXICAM 15 MG/1
TABLET ORAL
Qty: 30 TAB | Refills: 0 | Status: SHIPPED | OUTPATIENT
Start: 2017-11-20 | End: 2018-05-17

## 2017-11-20 NOTE — MR AVS SNAPSHOT
Visit Information Date & Time Provider Department Dept. Phone Encounter #  
 11/20/2017  1:30 PM Shaina Cruz MD [facility] [phone] Upcoming Health Maintenance Date Due Hepatitis C Screening 1960 DTaP/Tdap/Td series (1 - Tdap) 3/8/1981 BREAST CANCER SCRN MAMMOGRAM 3/8/2010 Influenza Age 5 to Adult 8/1/2017 COLONOSCOPY 6/21/2027 Allergies as of 11/20/2017  Review Complete On: 11/20/2017 By: Ernestine Rivera No Known Allergies Current Immunizations  Reviewed on 7/12/2013 No immunizations on file. Not reviewed this visit Vitals BP Pulse Temp Resp Height(growth percentile) Weight(growth percentile) 106/76 (BP 1 Location: Left arm, BP Patient Position: Sitting) 63 98.2 °F (36.8 °C) (Oral) 14 5' 3\" (1.6 m) 208 lb 9.6 oz (94.6 kg) SpO2 BMI OB Status Smoking Status 98% 36.95 kg/m2 Hysterectomy Never Smoker Vitals History BMI and BSA Data Body Mass Index Body Surface Area  
 36.95 kg/m 2 2.05 m 2 Preferred Pharmacy [facility] Name Phone [id number] [address] [phone] Your Updated Medication List  
  
   
This list is accurate as of: 11/20/17  2:27 PM.  Always use your most recent med list. amLODIPine 10 mg tablet Commonly known as:  NORVASC  
take 1 tablet by mouth once daily COREG CR 40 mg CR capsule Generic drug:  carvedilol  
take 1 capsule by mouth once daily WITH BREAKFAST  
  
 lisinopril-hydroCHLOROthiazide 20-12.5 mg per tablet Commonly known as:  PRINZIDE, ZESTORETIC  
take 1 tablet by mouth every morning for blood pressure PREMARIN 0.625 mg tablet Generic drug:  conjugated estrogens  
take 1 tablet by mouth once daily  
  
 promethazine-codeine 6.25-10 mg/5 mL syrup Commonly known as:  PHENERGAN with CODEINE  
TAKE 5 MLS BY MOUTH FOUR TIMES A DAY  
  
 sertraline 100 mg tablet Commonly known as:  ZOLOFT  
take 1 tablet by mouth once daily VITAMIN D 5,000 unit Tab tablet Generic drug:  cholecalciferol (VITAMIN D3) Take  by mouth. Introducing [facility]! Dear Maria D Royal: Thank you for requesting a [employer] account. Our records indicate that you already have an active [employer] account. You can access your account anytime at [email or web address] Did you know that you can access your hospital and ER discharge instructions at any time in [employer]? You can also review all of your test results from your hospital stay or ER visit. Additional Information If you have questions, please visit the Frequently Asked Questions section of the [employer] website at [email or web address] Remember, [employer] is NOT to be used for urgent needs. For medical emergencies, dial 911. Now available from your iPhone and Android! Please provide this summary of care documentation to your next provider. Your primary care clinician is listed as Yuliana Armijo. If you have any questions after today's visit, please call [phone].

## 2017-11-20 NOTE — PROGRESS NOTES
1. Have you been to the ER, urgent care clinic or hospitalized since your last visit? NO.     2. Have you seen or consulted any other health care providers outside of the [address] [place] [address] since your last visit (Include any pap smears or colon screening)? NO      Do you have an Advanced Directive? NO    Would you like information on Advanced Directives?  NO

## 2017-11-20 NOTE — PROGRESS NOTES
62 y.o. BLACK OR  female who presents for evaluation. She noted onset of left dorsal foot pain about 2 days ago. Pretty much awakened with it, tried a does of aleve which didn't help. She does not recall any injury, no bruising, swelling or rash. No new shoes, she stands and walks quite a bit at her job but nothing new there. She has not been exercising. No swelling, redness, warmth. The pain character is sometimes a sharp pain, sometimes pins and needles.   No known h/o l spine disease or radiculopathy, no weakness or numbness    Past Medical History:   Diagnosis Date    Allergic rhinitis     Anxiety     WADE-7 was 6/21    Chronic venous hypertension without complications 2/00/8644    Colon polyp 06/2017    Dr Rob German Depression     1980s treated w zoloft    FHx: heart disease     Grave's disease 2003    and hyperthyroidism Dr. Dmitry Ga Hypertension     neg w/u secondary causes 3/15 Dr Reva Carlos Hypovitaminosis D     Morbid obesity ([facility] [address]     peak weight 225 lbs, bmi 39.1 from 2/15    Subacromial bursitis     Venous reflux     12/14 EMILY Cardenas     Past Surgical History:   Procedure Laterality Date    CARDIAC SURG PROCEDURE UNLIST  1/14    negative thallium ef 65%    HX COLONOSCOPY      Dr Leonora Smith 7/3/12 negative; 6/21/17 polyp    HX CHADWICK AND BSO      NEUROLOGICAL PROCEDURE UNLISTED  3/15    ct head negative    VASCULAR SURGERY PROCEDURE UNLIST  2/14    negative BLE venous doppler     Current Outpatient Prescriptions   Medication Sig    meloxicam (MOBIC) 15 mg tablet Take 1 tab daily as needed for pain, take with meals    amLODIPine (NORVASC) 10 mg tablet take 1 tablet by mouth once daily    COREG CR 40 mg CR capsule take 1 capsule by mouth once daily WITH BREAKFAST    sertraline (ZOLOFT) 100 mg tablet take 1 tablet by mouth once daily    PREMARIN 0.625 mg tablet take 1 tablet by mouth once daily    lisinopril-hydroCHLOROthiazide (PRINZIDE, ZESTORETIC) 20-12.5 mg per tablet take 1 tablet by mouth every morning for blood pressure    Cholecalciferol, Vitamin D3, (VITAMIN D) 5,000 unit Tab Take  by mouth.  promethazine-codeine (PHENERGAN WITH CODEINE) 6.25-10 mg/5 mL syrup TAKE 5 MLS BY MOUTH FOUR TIMES A DAY     No current facility-administered medications for this visit. No Known Allergies    Visit Vitals    /76 (BP 1 Location: Left arm, BP Patient Position: Sitting)    Pulse 63    Temp 98.2 °F (36.8 °C) (Oral)    Resp 14    Ht 5' 3\" (1.6 m)    Wt 208 lb 9.6 oz (94.6 kg)    SpO2 98%    BMI 36.95 kg/m2   no swelling/warmth/erythema noted. Mild tenderness on palpation of the dorsum of the left foot. Pulses 2+ dp and pt w excellent cap refill, no skin lesions noted. No lymphangitis, no mtp joint swelling or tenderness. Neg straight leg, calf swelling    Assessment and plan:  1. Foot pain, possibly tendonitis? Trial mobic, ice/heat, rest, call w update        Above conditions discussed at length and patient vocalized understanding.   All questions answered to patient satisfaction

## 2017-12-08 DIAGNOSIS — J10.1 INFLUENZA A: ICD-10-CM

## 2017-12-08 RX ORDER — HYDROCODONE POLISTIREX AND CHLORPHENIRAMINE POLISTIREX 10; 8 MG/5ML; MG/5ML
5 SUSPENSION, EXTENDED RELEASE ORAL
Qty: 120 ML | Refills: 0 | Status: SHIPPED | OUTPATIENT
Start: 2017-12-08 | End: 2018-05-17

## 2018-03-22 DIAGNOSIS — J40 BRONCHITIS: ICD-10-CM

## 2018-03-22 RX ORDER — ESTROGENS, CONJUGATED 0.62 MG/1
TABLET, FILM COATED ORAL
Qty: 90 TAB | Refills: 3 | Status: SHIPPED | OUTPATIENT
Start: 2018-03-22 | End: 2019-04-12 | Stop reason: SDUPTHER

## 2018-05-17 ENCOUNTER — OFFICE VISIT (OUTPATIENT)
Dept: INTERNAL MEDICINE CLINIC | Age: 58
End: 2018-05-17

## 2018-05-17 VITALS
TEMPERATURE: 98.4 F | BODY MASS INDEX: 37.39 KG/M2 | SYSTOLIC BLOOD PRESSURE: 112 MMHG | HEART RATE: 60 BPM | WEIGHT: 211 LBS | RESPIRATION RATE: 18 BRPM | DIASTOLIC BLOOD PRESSURE: 74 MMHG | OXYGEN SATURATION: 98 % | HEIGHT: 63 IN

## 2018-05-17 DIAGNOSIS — M79.671 PAIN IN BOTH FEET: Primary | ICD-10-CM

## 2018-05-17 DIAGNOSIS — M79.672 PAIN IN BOTH FEET: Primary | ICD-10-CM

## 2018-05-17 DIAGNOSIS — R20.2 PARESTHESIA: ICD-10-CM

## 2018-05-17 DIAGNOSIS — I10 ESSENTIAL HYPERTENSION: ICD-10-CM

## 2018-05-17 DIAGNOSIS — E03.9 ACQUIRED HYPOTHYROIDISM: ICD-10-CM

## 2018-05-17 PROBLEM — E66.01 SEVERE OBESITY (BMI 35.0-39.9) WITH COMORBIDITY (HCC): Status: ACTIVE | Noted: 2018-05-17

## 2018-05-17 NOTE — PROGRESS NOTES
62 y.o. BLACK OR  female who presents for evaluation. For the last 2 weeks, she's noted some discomfort in her left>right foot. They feel 'cold' although no actual thermal difference. Sometimes she feels 'pins and needles' sensations in the soles of the feet. Denies any back pain or radicular sx down the leg. No swelling, injury or bruising. No claudication, still able to do her exercises.   She had vein closure by Dr Marlon Gay on the left side only about a year ago    Past Medical History:   Diagnosis Date    Allergic rhinitis     Anxiety     WADE-7 was 6/21    Chronic venous hypertension without complications 8/44/1551    Colon polyp 06/2017    Dr Dima Matthews Depression     1980s treated w zoloft    FHx: heart disease     Grave's disease 2003    and hyperthyroidism Dr. Casey Montez Hypertension     neg w/u secondary causes 3/15 Dr Svitlana Burnett Hypovitaminosis D     Morbid obesity ([facility] [address]     peak weight 225 lbs, bmi 39.1 from 2/15    Subacromial bursitis     Venous reflux     12/14 EMILY Lopez     Past Surgical History:   Procedure Laterality Date    CARDIAC SURG PROCEDURE UNLIST  1/14    negative thallium ef 65%    HX COLONOSCOPY      Dr Pleasant Closs 7/3/12 negative; 6/21/17 polyp    HX CHADWICK AND BSO      NEUROLOGICAL PROCEDURE UNLISTED  3/15    ct head negative    VASCULAR SURGERY PROCEDURE UNLIST  2017    left leg saphenous vein closure Dr Calvin Pillai History    Marital status:      Spouse name: N/A    Number of children: 3    Years of education: N/A     Occupational History    works at [email or web address] History Main Topics    Smoking status: Never Smoker    Smokeless tobacco: Never Used    Alcohol use No    Drug use: No    Sexual activity: Not Currently     Other Topics Concern    Not on file     Social History Narrative     Current Outpatient Prescriptions   Medication Sig    PREMARIN 0.625 mg tablet take 1 tablet by mouth once daily    amLODIPine (NORVASC) 10 mg tablet take 1 tablet by mouth once daily    COREG CR 40 mg CR capsule take 1 capsule by mouth once daily WITH BREAKFAST    lisinopril-hydroCHLOROthiazide (PRINZIDE, ZESTORETIC) 20-12.5 mg per tablet take 1 tablet by mouth every morning for blood pressure    Cholecalciferol, Vitamin D3, (VITAMIN D) 5,000 unit Tab Take  by mouth.  chlorpheniramine-HYDROcodone (TUSSIONEX) 10-8 mg/5 mL suspension Take 5 mL by mouth every twelve (12) hours as needed for Cough. Max Daily Amount: 10 mL.  meloxicam (MOBIC) 15 mg tablet Take 1 tab daily as needed for pain, take with meals    sertraline (ZOLOFT) 100 mg tablet take 1 tablet by mouth once daily    promethazine-codeine (PHENERGAN WITH CODEINE) 6.25-10 mg/5 mL syrup TAKE 5 MLS BY MOUTH FOUR TIMES A DAY     No current facility-administered medications for this visit. No Known Allergies    Visit Vitals    /74 (BP 1 Location: Right arm, BP Patient Position: Sitting)    Pulse 60    Temp 98.4 °F (36.9 °C) (Oral)    Resp 18    Ht 5' 3\" (1.6 m)    Wt 211 lb (95.7 kg)    SpO2 98%    BMI 37.38 kg/m2   A&O x3  Affect is appropriate. Mood stable  No apparent distress  Anicteric, no JVD, adenopathy or thyromegaly. No carotid bruits or radiated murmur  Lungs clear to auscultation, no wheezes or rales  Heart showed regular rate and rhythm. No murmur, rubs, gallops  Abdomen soft nontender, no hepatosplenomegaly or masses. Extremities without edema right, trace pedal edema on left. Pulses 2+ symmetrically dp/pt  Soft touch and vibration grossly intact bilat feet, ankles, knees  No clear areas of tenderness anywhere in the feet    Assessment and plan:  1. Bilat foot pain etiology unclear. Will get opinion from podiatry      Above conditions discussed at length and patient vocalized understanding.   All questions answered to patient satisfaction

## 2018-05-17 NOTE — MR AVS SNAPSHOT
[address] [place] [address] [address] 
[phone] Patient: Luigi Aldrich MRN: [id number] :1960 Visit Information Date & Time Provider Department Dept. Phone Encounter #  
 2018  1:45 PM Rd Cali MD [facility] [phone] Upcoming Health Maintenance Date Due Hepatitis C Screening 1960 DTaP/Tdap/Td series (1 - Tdap) 3/8/1981 BREAST CANCER SCRN MAMMOGRAM 3/8/2010 Influenza Age 5 to Adult 2018 COLONOSCOPY 2027 Allergies as of 2018  Review Complete On: 2018 By: Bambi Tinoco No Known Allergies Current Immunizations  Reviewed on 2013 No immunizations on file. Not reviewed this visit Vitals BP Pulse Temp Resp Height(growth percentile) Weight(growth percentile) 112/74 (BP 1 Location: Right arm, BP Patient Position: Sitting) 60 98.4 °F (36.9 °C) (Oral) 18 5' 3\" (1.6 m) 211 lb (95.7 kg) SpO2 BMI OB Status Smoking Status 98% 37.38 kg/m2 Hysterectomy Never Smoker Vitals History BMI and BSA Data Body Mass Index Body Surface Area  
 37.38 kg/m 2 2.06 m 2 Preferred Pharmacy Pharmacy Name Phone [facility] [address] [facility] [address] [facility] [phone] Your Updated Medication List  
  
   
This list is accurate as of 18  2:35 PM.  Always use your most recent med list. amLODIPine 10 mg tablet Commonly known as:  NORVASC  
take 1 tablet by mouth once daily  
  
 chlorpheniramine-HYDROcodone 10-8 mg/5 mL suspension Commonly known as:  [employer]b Take 5 mL by mouth every twelve (12) hours as needed for Cough. Max Daily Amount: 10 mL. COREG CR 40 mg CR capsule Generic drug:  carvedilol  
take 1 capsule by mouth once daily WITH BREAKFAST  
  
 lisinopril-hydroCHLOROthiazide 20-12.5 mg per tablet Commonly known as:  Eather Revel  
take 1 tablet by mouth every morning for blood pressure  
  
 meloxicam 15 mg tablet Commonly known as:  MOBIC Take 1 tab daily as needed for pain, take with meals PREMARIN 0.625 mg tablet Generic drug:  conjugated estrogens  
take 1 tablet by mouth once daily  
  
 promethazine-codeine 6.25-10 mg/5 mL syrup Commonly known as:  PHENERGAN with CODEINE  
TAKE 5 MLS BY MOUTH FOUR TIMES A DAY  
  
 sertraline 100 mg tablet Commonly known as:  ZOLOFT  
take 1 tablet by mouth once daily VITAMIN D 5,000 unit Tab tablet Generic drug:  cholecalciferol (VITAMIN D3) Take  by mouth. Introducing [facility]! Dear Margy Godwin: Thank you for requesting a [employer] account. Our records indicate that you already have an active [employer] account. You can access your account anytime at [email or web address] Did you know that you can access your hospital and ER discharge instructions at any time in [employer]? You can also review all of your test results from your hospital stay or ER visit. Additional Information If you have questions, please visit the Frequently Asked Questions section of the [employer] website at [email or web address] Remember, [employer] is NOT to be used for urgent needs. For medical emergencies, dial 911. Now available from your iPhone and Android! Please provide this summary of care documentation to your next provider. Your primary care clinician is listed as Jennifer Moreno. If you have any questions after today's visit, please call [phone].

## 2018-05-17 NOTE — PROGRESS NOTES
pls call pt    Referral to podiatry sent but she's overdue for labs and RPE  pls sched in Jun/Jul timeframe

## 2018-05-17 NOTE — PROGRESS NOTES
Chief Complaint   Patient presents with    Cold extremity     Patient present with feet being cold. Onset a couple of weeks ago. 1. Have you been to the ER, urgent care clinic or hospitalized since your last visit? NO.     2. Have you seen or consulted any other health care providers outside of the [facility] since your last visit (Include any pap smears or colon screening)?  NO

## 2018-05-18 ENCOUNTER — TELEPHONE (OUTPATIENT)
Dept: INTERNAL MEDICINE CLINIC | Age: 58
End: 2018-05-18

## 2018-05-18 DIAGNOSIS — B37.31 YEAST VAGINITIS: Primary | ICD-10-CM

## 2018-05-18 RX ORDER — FLUCONAZOLE 150 MG/1
150 TABLET ORAL DAILY
Qty: 1 TAB | Refills: 0 | Status: SHIPPED | OUTPATIENT
Start: 2018-05-18 | End: 2018-06-20 | Stop reason: SDUPTHER

## 2018-05-21 ENCOUNTER — TELEPHONE (OUTPATIENT)
Dept: INTERNAL MEDICINE CLINIC | Age: 58
End: 2018-05-21

## 2018-05-21 NOTE — TELEPHONE ENCOUNTER
Patient advised referral to podiatry has been put in and they will be contacting her to schedule appt. Also advised her that she is due for physical in June or July. Stated she will call back to schedule after she sees podiatry.

## 2018-06-20 ENCOUNTER — TELEPHONE (OUTPATIENT)
Dept: INTERNAL MEDICINE CLINIC | Age: 58
End: 2018-06-20

## 2018-06-20 DIAGNOSIS — B37.31 YEAST VAGINITIS: ICD-10-CM

## 2018-06-20 RX ORDER — FLUCONAZOLE 150 MG/1
150 TABLET ORAL DAILY
Qty: 1 TAB | Refills: 0 | Status: SHIPPED | OUTPATIENT
Start: 2018-06-20 | End: 2018-06-21 | Stop reason: SDUPTHER

## 2018-06-20 NOTE — TELEPHONE ENCOUNTER
Called patient to see what symptoms she was having. Pt stated the same symptoms she was having in may when she came in for a office visit. Pt requesting a refill on diflucan.      Not tried any OTC meds

## 2018-06-20 NOTE — TELEPHONE ENCOUNTER
Patient stating she has a yeast infection and wants to know if you will call in something for her. Stating you have prescribed a single pill for this in the past. I advised her that she would need to be seen but refused appt.

## 2018-06-21 RX ORDER — FLUCONAZOLE 150 MG/1
150 TABLET ORAL DAILY
Qty: 1 TAB | Refills: 0 | Status: SHIPPED | OUTPATIENT
Start: 2018-06-21 | End: 2018-06-22

## 2018-06-24 RX ORDER — CARVEDILOL PHOSPHATE 40 MG/1
CAPSULE, EXTENDED RELEASE ORAL
Qty: 30 CAP | Refills: 4 | Status: SHIPPED | OUTPATIENT
Start: 2018-06-24 | End: 2018-11-29 | Stop reason: SDUPTHER

## 2018-07-08 ENCOUNTER — TELEPHONE (OUTPATIENT)
Dept: INTERNAL MEDICINE CLINIC | Age: 58
End: 2018-07-08

## 2018-07-09 NOTE — TELEPHONE ENCOUNTER
pls call    She left forms for disability  What is it for specifically?   We've not seen her for anything that requires disablity

## 2018-09-26 ENCOUNTER — HOSPITAL ENCOUNTER (OUTPATIENT)
Dept: VASCULAR SURGERY | Age: 58
Discharge: HOME | End: 2018-09-26
Attending: NURSE PRACTITIONER
Payer: COMMERCIAL

## 2018-09-26 ENCOUNTER — OFFICE VISIT (OUTPATIENT)
Dept: INTERNAL MEDICINE CLINIC | Age: 58
End: 2018-09-26

## 2018-09-26 VITALS
HEART RATE: 55 BPM | BODY MASS INDEX: 37.7 KG/M2 | OXYGEN SATURATION: 96 % | SYSTOLIC BLOOD PRESSURE: 126 MMHG | HEIGHT: 63 IN | RESPIRATION RATE: 15 BRPM | TEMPERATURE: 98 F | DIASTOLIC BLOOD PRESSURE: 82 MMHG | WEIGHT: 212.8 LBS

## 2018-09-26 DIAGNOSIS — M79.601: Primary | ICD-10-CM

## 2018-09-26 DIAGNOSIS — M79.601: ICD-10-CM

## 2018-09-26 PROCEDURE — 93971 EXTREMITY STUDY: CPT

## 2018-09-26 RX ORDER — NAPROXEN 500 MG/1
500 TABLET ORAL 2 TIMES DAILY WITH MEALS
Qty: 28 TAB | Refills: 0 | Status: SHIPPED | OUTPATIENT
Start: 2018-09-26 | End: 2018-10-10

## 2018-09-26 NOTE — PROGRESS NOTES
1. Have you been to the ER, urgent care clinic or hospitalized since your last visit? NO.     2. Have you seen or consulted any other health care providers outside of the [address] [place] [address] since your last visit (Include any pap smears or colon screening)? NO      Do you have an Advanced Directive? NO    Would you like information on Advanced Directives?  NO

## 2018-09-26 NOTE — PROGRESS NOTES
Gearlean Barthel is a 62 y.o.  female and presents with    Chief Complaint   Patient presents with    Arm Pain     Patient here for right arm pain. Patient reports from inner forearm going up to the underarm. Patient reports swelling, knots, warm to touch, tenderness when touched. x 6 days        Subjective:  HPI   Mrs. Allyn Severs presents today with complaints of right arm pain. She reports an inner forearm that radiates up to the axilla. She reports swelling, knot like feeling, and warmth to touch, tenderness to touch x 6 days. Associated symptoms of chest pain that is sharp lasting only seconds and then relieved on its own, over the last couple of months. Denies fever, chills, weakness, dyspnea, gi symptoms, headaches, dizziness, falls, trauma to the arm. She is on Premarin and with obesity. She is a never smoker. She denies skin changes to breast, drainage, or pruritus. Additional Concerns: none     ROS   Review of Systems   Constitutional: Negative. HENT: Negative. Eyes: Negative. Respiratory: Negative. Cardiovascular: Positive for chest pain. Gastrointestinal: Negative. Musculoskeletal: Negative. Skin: Negative. Neurological: Negative. Psychiatric/Behavioral: Negative. No Known Allergies    Current Outpatient Prescriptions   Medication Sig Dispense Refill    naproxen (NAPROSYN) 500 mg tablet Take 1 Tab by mouth two (2) times daily (with meals) for 14 days. 28 Tab 0    carvedilol (COREG CR) 40 mg CR capsule take 1 capsule by mouth once daily 30 Cap 4    PREMARIN 0.625 mg tablet take 1 tablet by mouth once daily 90 Tab 3    amLODIPine (NORVASC) 10 mg tablet take 1 tablet by mouth once daily 90 Tab 3    lisinopril-hydroCHLOROthiazide (PRINZIDE, ZESTORETIC) 20-12.5 mg per tablet take 1 tablet by mouth every morning for blood pressure 90 Tab 1    Cholecalciferol, Vitamin D3, (VITAMIN D) 5,000 unit Tab Take  by mouth.         COREG CR 40 mg CR capsule take 1 capsule by mouth once daily WITH BREAKFAST 30 Cap 4       Social History     Social History    Marital status:      Spouse name: N/A    Number of children: 3    Years of education: N/A     Occupational History    works at [employer]. Smoking status: Never Smoker    Smokeless tobacco: Never Used    Alcohol use No    Drug use: No    Sexual activity: Not Currently     Other Topics Concern    Not on file     Social History Narrative       Past Medical History:   Diagnosis Date    Allergic rhinitis     Anxiety     WADE-7 was 6/21    Chronic venous hypertension without complications 0/48/9991    Colon polyp 06/2017    Dr Jhon Bess Depression     1980s treated w zoloft    FHx: heart disease     Grave's disease 2003    and hyperthyroidism Dr. Waldemar Matt Hypertension     neg w/u secondary causes 3/15 Dr Genet Benitez    Hypovitaminosis D     Morbid obesity ([facility] [address]     peak weight 225 lbs, bmi 39.1 from 2/15    Subacromial bursitis     Venous reflux     12/14 EMILY Bynum       Past Surgical History:   Procedure Laterality Date    CARDIAC SURG PROCEDURE UNLIST  1/14    negative thallium ef 65%    HX COLONOSCOPY      Dr Akilah Lynne 7/3/12 negative; 6/21/17 polyp    HX CHADWICK AND BSO      NEUROLOGICAL PROCEDURE UNLISTED  3/15    ct head negative    VASCULAR SURGERY PROCEDURE UNLIST  2017    left leg saphenous vein closure Dr Pravin Dwyer       Family History   Problem Relation Age of Onset    Heart Disease Father     Hypertension Sister     Heart Disease Mother     Seizures Sister     Sudden Death Neg Hx     Heart Attack Neg Hx        Objective:  Vitals:    09/26/18 1035   BP: 126/82   Pulse: (!) 55   Resp: 15   Temp: 98 °F (36.7 °C)   TempSrc: Oral   SpO2: 96%   Weight: 212 lb 12.8 oz (96.5 kg)   Height: 5' 3\" (1.6 m)   PainSc:   7   PainLoc: Arm       LABS   Results for orders placed or performed in visit on 12/02/15   LIPID PANEL   Result Value Ref Range    Triglyceride 45 40 - 149 mg/dL    HDL Cholesterol 72 (H) 40 - 59 mg/dL    Cholesterol, total 176 110 - 200 mg/dL    LDL, calculated 96 50 - 99 mg/dL    VLDL, calculated 9 8 - 30 mg/dL   METABOLIC PANEL, COMPREHENSIVE   Result Value Ref Range    Glucose 91 65 - 99 mg/dL    BUN 14 6 - 22 mg/dL    Creatinine 0.7 0.5 - 1.2 mg/dL    Sodium 140 133 - 145 mmol/L    Potassium 4.1 3.5 - 5.5 mmol/L    Chloride 103 98 - 110 mmol/L    CO2 25 20 - 32 mmol/L    AST (SGOT) 19 10 - 37 U/L    ALT (SGPT) 18 5 - 40 U/L    Alk. phosphatase 93 25 - 115 U/L    Bilirubin, total 0.4 0.2 - 1.2 mg/dL    Protein, total 7.0 6.4 - 8.3 g/dL    Albumin 4.2 3.5 - 5.0 g/dL    Globulin 2.8 2.0 - 4.0 g/dL    A-G Ratio 1.5 1.1 - 2.6 ratio    Calcium 8.8 8.4 - 10.5 mg/dL    Anion gap 12.0 mmol/L    GFRAA >60.0 >60.0    GFRNA >60.0 >60.0   TSH, 3RD GENERATION   Result Value Ref Range    TSH 0.38 0.27 - 4.20 mcU/mL   CBC WITH AUTOMATED DIFF   Result Value Ref Range    RBC 4.14 3.80 - 5.20 M/uL    HCT 39.3 35.1 - 48.0 %    MCV 95 80 - 95 fL    MCHC 33 32 - 36 g/dL    RDW 13.5 10.0 - 16.0 %    PLATELET 253 825 - 342 K/uL    MPV 11.8 (H) 6.0 - 10.8 fL    NEUTROPHILS 36 (L) 40 - 75 %    Lymphocytes 55 (H) 27 - 45 %    MONOCYTES 8 3 - 9 %    EOSINOPHILS 1 0 - 6 %    BASOPHILS 0 0 - 2 %    WBC 4.5 4.0 - 11.0 K/uL    HGB 12.9 11.7 - 16.0 g/dL    MCH 31 26 - 34 pg    ABS. NEUTROPHILS 1.6 (L) 1.8 - 7.7 K/uL    ABSOLUTE LYMPHOCYTE COUNT 2.5 1.0 - 4.8 K/uL    ABS. MONOCYTES 0.4 0.1 - 0.9 K/uL    ABS. EOSINOPHILS 0.0 0.0 - 0.5 K/uL    ABS.  BASOPHILS 0.0 0.0 - 0.2 K/uL   URINALYSIS W/ RFLX MICROSCOPIC   Result Value Ref Range    Specific Gravity 1.019 1.005 - 1.03    pH (UA) 7.0 5.0 - 8.0    Protein 30* (A) Negative, mg/dL    Glucose Negative Negative mg/dL    Ketone Negative Negative mg/dL    Bilirubin Negative Negative    Blood Negative Negative    Nitrites Negative Negative    Leukocyte Esterase Negative Negative    Urobilinogen <2.0 2.0-4.0 mg mg/dL    WBC 0-2 0 - 2 /hpf    RBC 0-2 Negative, /hpf    Epithelial cells 3-5 (A) 0 - 2 /hpf       TESTS  none    PE  Physical Exam   Constitutional: She is oriented to person, place, and time. She appears well-developed and well-nourished. No distress. HENT:   Head: Normocephalic and atraumatic. Cardiovascular: Normal rate, regular rhythm, normal heart sounds and intact distal pulses. Pulmonary/Chest: Effort normal and breath sounds normal. No respiratory distress. She has no wheezes. She has no rales. She exhibits no tenderness. Musculoskeletal: Normal range of motion. She exhibits edema and tenderness. She exhibits no deformity. Neurological: She is alert and oriented to person, place, and time. Skin: Skin is warm, dry and intact. No rash noted. She is not diaphoretic. No erythema. Psychiatric: She has a normal mood and affect. Her behavior is normal. Judgment and thought content normal.   Vitals reviewed. Assessment/Plan:    1. Acute right upper extremity pain, swelling, and heat that has worsened over the last 6 days. No erythema. Pain with movement and with palpation. US RUE stat. Ddimer today. If negative will order further labs to include cbc and esr. Naprosyn and heat. Jayla Dolan was able to make an appointment at [facility] at 1500 today. Lab review: orders written for new lab studies as appropriate; see orders    Today's Visit:   Diagnoses and all orders for this visit:    1. Upper extremity pain, medial, right  -     DUPLEX UPPER EXT VENOUS RIGHT; Future  -     naproxen (NAPROSYN) 500 mg tablet; Take 1 Tab by mouth two (2) times daily (with meals) for 14 days. -     D DIMER; Future        Health Maintenance: Deferred to PCP. I have discussed the diagnosis with the patient and the intended plan as seen in the above orders. The patient has received an after-visit summary and questions were answered concerning future plans. I have discussed medication side effects and warnings with the patient as well.  I have reviewed the plan of care with the patient, accepted their input and they are in agreement with the treatment goals. Follow-up Disposition: Not on File   More than 1/2 of this 15 minute visit was spent in counseling and coordination of care, as described above.     ARUN Perea  Internist of [address], [address] [place] [address].  Phone: [phone]  Fax: [phone]

## 2018-09-26 NOTE — MR AVS SNAPSHOT
[address] 
[phone] Patient: Lavern Darden MRN: [id number] :1960 Visit Information Date & Time Provider Department Dept. Phone Encounter #  
 2018 10:30 AM Madhu Waters NP [facility] [address] [place] [address] Road [phone] [id number] Upcoming Health Maintenance Date Due Hepatitis C Screening 1960 DTaP/Tdap/Td series (1 - Tdap) 3/8/1981 Shingrix Vaccine Age 50> (1 of 2) 3/8/2010 BREAST CANCER SCRN MAMMOGRAM 3/8/2010 Influenza Age 5 to Adult 2018 COLONOSCOPY 2027 Allergies as of 2018  Review Complete On: 2018 By: Sherren Span No Known Allergies Current Immunizations  Reviewed on 2013 No immunizations on file. Not reviewed this visit You Were Diagnosed With   
  
 Codes Comments Upper extremity pain, medial, right    -  Primary ICD-10-CM: M79.601 ICD-9-CM: 729.5 Vitals BP Pulse Temp Resp Height(growth percentile) Weight(growth percentile) 126/82 (BP 1 Location: Left arm, BP Patient Position: Sitting) (!) 55 98 °F (36.7 °C) (Oral) 15 5' 3\" (1.6 m) 212 lb 12.8 oz (96.5 kg) SpO2 BMI OB Status Smoking Status 96% 37.7 kg/m2 Hysterectomy Never Smoker Vitals History BMI and BSA Data Body Mass Index Body Surface Area 37.7 kg/m 2 2.07 m 2 Preferred Pharmacy Pharmacy Name Phone [facility]. Amadeo Goldberg, [address] [facility] [phone] Your Updated Medication List  
  
   
This list is accurate as of 18 11:24 AM.  Always use your most recent med list. amLODIPine 10 mg tablet Commonly known as:  NORVASC  
take 1 tablet by mouth once daily * COREG CR 40 mg CR capsule Generic drug:  carvedilol  
take 1 capsule by mouth once daily WITH BREAKFAST * carvedilol 40 mg CR capsule Commonly known as:  COREG CR  
 take 1 capsule by mouth once daily  
  
 lisinopril-hydroCHLOROthiazide 20-12.5 mg per tablet Commonly known as:  Camella Rasp  
take 1 tablet by mouth every morning for blood pressure  
  
 naproxen 500 mg tablet Commonly known as:  NAPROSYN Take 1 Tab by mouth two (2) times daily (with meals) for 14 days. PREMARIN 0.625 mg tablet Generic drug:  conjugated estrogens  
take 1 tablet by mouth once daily VITAMIN D 5,000 unit Tab tablet Generic drug:  cholecalciferol (VITAMIN D3) Take  by mouth. * Notice: This list has 2 medication(s) that are the same as other medications prescribed for you. Read the directions carefully, and ask your doctor or other care provider to review them with you. Prescriptions Sent to Pharmacy Refills  
 naproxen (NAPROSYN) 500 mg tablet 0 Sig: Take 1 Tab by mouth two (2) times daily (with meals) for 14 days. Class: Normal  
 Pharmacy: [facility]. Deena Nora, [address] [place] [address] #: [phone] Route: Oral  
  
We Performed the Following D DIMER [id number] CPT(R)] To-Do List   
 09/26/2018 Lab:  D DIMER   
  
 09/26/2018 Vascular/US:  DUPLEX UPPER EXT VENOUS RIGHT   
  
 09/26/2018 3:00 PM  
(Arrive by 2:45 PM) Appointment with [facility] VAS LAB RM 1 at [facility] VASCULAR [address] [facility] ([phone]) Please have patient bring a copy of their order if same day add-on. It can also be faxed to [facility] - [phone]. There are no restrictions for this study. The patient can eat breakfast and take their medications. Introducing [facility] & HEALTH SERVICES! Dear Edith Loud: Thank you for requesting a Mettl account. Our records indicate that you already have an active Mettl account. You can access your account anytime at [email or web address]. [email or web address] Did you know that you can access your hospital and ER discharge instructions at any time in Orlumet? You can also review all of your test results from your hospital stay or ER visit. Additional Information If you have questions, please visit the Frequently Asked Questions section of the Orlumet website at [email or web address] Remember, Orlumet is NOT to be used for urgent needs. For medical emergencies, dial 911. Now available from your iPhone and Android! Please provide this summary of care documentation to your next provider. Your primary care clinician is listed as Karolyn Guevara. If you have any questions after today's visit, please call [phone].

## 2018-09-27 DIAGNOSIS — R59.0 AXILLARY LYMPHADENOPATHY: Primary | ICD-10-CM

## 2018-09-27 NOTE — PROGRESS NOTES
Please inform Mrs. Mari Adkins that the US was negative for blood clots. I am still pending the lab I drew. If possible I did put in a few other labs, if you are available to have them done soon that would be great and follow up with PCP in about 1 week to check the arm. Continue the Naprosyn and heat and monitor.

## 2018-09-28 ENCOUNTER — TELEPHONE (OUTPATIENT)
Dept: INTERNAL MEDICINE CLINIC | Age: 58
End: 2018-09-28

## 2018-09-28 NOTE — TELEPHONE ENCOUNTER
----- Message from Beena Coleman NP sent at 9/27/2018  1:00 PM EDT -----  Please inform Mrs. Sade Zapata that the US was negative for blood clots. I am still pending the lab I drew. If possible I did put in a few other labs, if you are available to have them done soon that would be great and follow up with PCP in about 1 week to check the arm. Continue the Naprosyn and heat and monitor.

## 2018-10-09 ENCOUNTER — TELEPHONE (OUTPATIENT)
Dept: INTERNAL MEDICINE CLINIC | Age: 58
End: 2018-10-09

## 2018-10-09 DIAGNOSIS — B37.31 YEAST VAGINITIS: Primary | ICD-10-CM

## 2018-10-09 RX ORDER — FLUCONAZOLE 150 MG/1
150 TABLET ORAL DAILY
Qty: 1 TAB | Refills: 0 | Status: SHIPPED | OUTPATIENT
Start: 2018-10-09 | End: 2018-10-10

## 2018-10-09 NOTE — TELEPHONE ENCOUNTER
Pt was taking rx Naproxen she started it 09/26 its a 14 day course but hasn't taken it for a couple days because she has a lot of discomfort in her vaginal area.     She is asking for something called into [facility] for yeast infection    Please advise

## 2018-10-30 RX ORDER — AMLODIPINE BESYLATE 10 MG/1
10 TABLET ORAL DAILY
Qty: 90 TAB | Refills: 0 | Status: SHIPPED | OUTPATIENT
Start: 2018-10-30 | End: 2019-02-13 | Stop reason: SDUPTHER

## 2018-10-30 NOTE — TELEPHONE ENCOUNTER
Lab has been scheduled. She will call us back to schedule follow up after she looks at her schedule.

## 2018-10-30 NOTE — TELEPHONE ENCOUNTER
Last Visit: 09/26/2018 with OFELIA Moreno    Next Appointment: none   Previous Refill Encounters: 08/10/2017 per MD Melissa Sanchez #[phone] with 3 refills     Requested Prescriptions     Pending Prescriptions Disp Refills    amLODIPine (NORVASC) 10 mg tablet [Pharmacy Med Name: AMLODIPINE BESYLATE 10 MG TAB] 90 Tab 3     Sig: Take 1 Tab by mouth daily.

## 2018-11-07 ENCOUNTER — LAB ONLY (OUTPATIENT)
Dept: INTERNAL MEDICINE CLINIC | Age: 58
End: 2018-11-07

## 2018-11-07 DIAGNOSIS — Z11.59 NEED FOR HEPATITIS C SCREENING TEST: Primary | ICD-10-CM

## 2018-11-08 LAB
A-G RATIO,AGRAT: 1.5 RATIO (ref 1.1–2.6)
ALBUMIN SERPL-MCNC: 4.4 G/DL (ref 3.5–5)
ALP SERPL-CCNC: 102 U/L (ref 25–115)
ALT SERPL-CCNC: 16 U/L (ref 5–40)
ANION GAP SERPL CALC-SCNC: 12 MMOL/L
AST SERPL W P-5'-P-CCNC: 19 U/L (ref 10–37)
BILIRUB SERPL-MCNC: 0.4 MG/DL (ref 0.2–1.2)
BUN SERPL-MCNC: 15 MG/DL (ref 6–22)
CALCIUM SERPL-MCNC: 9 MG/DL (ref 8.4–10.5)
CHLORIDE SERPL-SCNC: 102 MMOL/L (ref 98–110)
CHOLEST SERPL-MCNC: 172 MG/DL (ref 110–200)
CO2 SERPL-SCNC: 25 MMOL/L (ref 20–32)
CREAT SERPL-MCNC: 0.8 MG/DL (ref 0.5–1.2)
ERYTHROCYTE [DISTWIDTH] IN BLOOD BY AUTOMATED COUNT: 13.4 % (ref 10–15.5)
GFRAA, 66117: >60
GFRNA, 66118: >60
GLOBULIN,GLOB: 2.9 G/DL (ref 2–4)
GLUCOSE SERPL-MCNC: 89 MG/DL (ref 70–99)
HCT VFR BLD AUTO: 40 % (ref 35.1–48)
HCV AB SER IA-ACNC: NORMAL
HDLC SERPL-MCNC: 2.5 MG/DL (ref 0–5)
HDLC SERPL-MCNC: 69 MG/DL (ref 40–59)
HGB BLD-MCNC: 13.4 G/DL (ref 11.7–16)
LDLC SERPL CALC-MCNC: 96 MG/DL (ref 50–99)
MCH RBC QN AUTO: 32 PG (ref 26–34)
MCHC RBC AUTO-ENTMCNC: 34 G/DL (ref 31–36)
MCV RBC AUTO: 96 FL (ref 80–95)
PLATELET # BLD AUTO: 200 K/UL (ref 140–440)
PMV BLD AUTO: 11.2 FL (ref 9–13)
POTASSIUM SERPL-SCNC: 4 MMOL/L (ref 3.5–5.5)
PROT SERPL-MCNC: 7.3 G/DL (ref 6.4–8.3)
RBC # BLD AUTO: 4.15 M/UL (ref 3.8–5.2)
SODIUM SERPL-SCNC: 139 MMOL/L (ref 133–145)
T4 FREE SERPL-MCNC: 1.2 NG/DL (ref 0.9–1.8)
TRIGL SERPL-MCNC: 35 MG/DL (ref 40–149)
TSH SERPL DL<=0.005 MIU/L-ACNC: 0.98 MCU/ML (ref 0.27–4.2)
VLDLC SERPL CALC-MCNC: 7 MG/DL (ref 8–30)
WBC # BLD AUTO: 4.6 K/UL (ref 4–11)

## 2018-11-14 DIAGNOSIS — E03.9 ACQUIRED HYPOTHYROIDISM: ICD-10-CM

## 2018-11-14 DIAGNOSIS — I10 ESSENTIAL HYPERTENSION: ICD-10-CM

## 2018-11-16 DIAGNOSIS — E03.9 ACQUIRED HYPOTHYROIDISM: ICD-10-CM

## 2018-11-29 RX ORDER — CARVEDILOL PHOSPHATE 40 MG/1
CAPSULE, EXTENDED RELEASE ORAL
Qty: 30 CAP | Refills: 4 | Status: SHIPPED | OUTPATIENT
Start: 2018-11-29 | End: 2019-05-03 | Stop reason: SDUPTHER

## 2018-12-04 NOTE — PROGRESS NOTES
62 y.o. BLACK OR  female who presents for evaluation. No cardiovascular complaints. She and her daughter have been going to [facility] and [place] No gi or gu complaints However, she is asking for dicfucan as she believes she has yeast vaginitis going on right now She continues to have pain in the left leg mainly. She saw Dr Brad White and no specific dx although no records for review. She had seen vascular mid 2017 and no other recs after the vein closure. Denies any back pain or radiation into the upper leg, weakness, numbness, saddle sx. Past Medical History:  
Diagnosis Date  Allergic rhinitis  Anxiety WADE-7 was 6/21  Chronic venous hypertension without complications 6/47/2909  Colon polyp 06/2017 Dr Rupert Stephenson  Depression 1980s treated w zoloft  FHx: heart disease  Grave's disease 2003  
 and hyperthyroidism Dr. Marina Service  Hypertension   
 neg w/u secondary causes 3/15 Dr Meenakshi Rojas  Hypovitaminosis D   
 Morbid obesity ([facility] [address] peak weight 225 lbs, bmi 39.1 from 2/15  Subacromial bursitis  Venous reflux 12/14 EMILY Thomson Past Surgical History:  
Procedure Laterality Date  CARDIAC SURG PROCEDURE UNLIST  1/14  
 negative thallium ef 65%  HX COLONOSCOPY Dr Rupert Stephenson 7/3/12 negative; 6/21/17 polyp  HX CHADWICK AND BSO  NEUROLOGICAL PROCEDURE UNLISTED  3/15  
 ct head negative  VASCULAR SURGERY PROCEDURE UNLIST  2017  
 left leg saphenous vein closure Dr Chandra King Social History Socioeconomic History  Marital status:  Spouse name: Not on file  Number of children: 3  
 Years of education: Not on file  Highest education level: Not on file Social Needs  Financial resource strain: Not on file  Food insecurity - worry: Not on file  Food insecurity - inability: Not on file  Transportation needs - medical: Not on file  Transportation needs - non-medical: Not on file Occupational History  Occupation: works at [employer] Tobacco Use  Smoking status: Never Smoker  Smokeless tobacco: Never Used Substance and Sexual Activity  Alcohol use: No  
 Drug use: No  
 Sexual activity: Not Currently Other Topics Concern  Not on file Social History Narrative  Not on file Current Outpatient Medications Medication Sig  carvedilol (COREG CR) 40 mg CR capsule take 1 capsule by mouth once daily  amLODIPine (NORVASC) 10 mg tablet Take 1 Tab by mouth daily.  PREMARIN 0.625 mg tablet take 1 tablet by mouth once daily  lisinopril-hydroCHLOROthiazide (PRINZIDE, ZESTORETIC) 20-12.5 mg per tablet take 1 tablet by mouth every morning for blood pressure  Cholecalciferol, Vitamin D3, (VITAMIN D) 5,000 unit Tab Take  by mouth. No current facility-administered medications for this visit. No Known Allergies REVIEW OF SYSTEMS: gyn 2015, Mammo 2011? [place] 2011? Dr Gabrielle Garcia Ophtho  no vision change or eye pain Oral  no mouth pain, tongue or tooth problems Ears  no hearing loss, ear pain, fullness, no swallowing problems Resp  no wheezing, chronic coughing, dyspnea GI  no heartburn, nausea, vomiting, change in bowel habits, bleeding, hemorrhoids Urinary  no dysuria, hematuria, flank pain, urgency, frequency Ortho  no swelling, dec ROM, myalgias Derm  no nail abnormalities, rashes, lesions of note, hair loss Psych  denies any anxiety or depression symptoms, no hallucinations or violent ideation Constitutional  no wt loss, night sweats, unexplained fevers Neuro  no focal weakness, numbness, paresthesias, incoordination, ataxia, involuntary movements Endo - no polyuria, polydipsia, nocturia, hot flashes Visit Vitals /76 Pulse 61 Temp 98.2 °F (36.8 °C) (Oral) Resp 14 Ht 5' 3\" (1.6 m) Wt 214 lb (97.1 kg) SpO2 98% BMI 37.91 kg/m² A&O x3 Affect is appropriate. Mood stable No apparent distress Anicteric, no JVD, adenopathy or thyromegaly. No carotid bruits or radiated murmur Lungs clear to auscultation, no wheezes or rales Heart showed regular rate and rhythm. No murmur, rubs, gallops Abdomen soft nontender, no hepatosplenomegaly or masses. Declined rectal exam as she will be seeing the specialist 
Extremities without edema. Pulses 1-2+ symmetrically. LABS From 11/07 showed gluc 92, cr 0.90,  alt 30, chol 164, tg 30, hdl 51, ldl-c 107, wbc 4.5, hb 12.7, plt 190, tsh 0.50 From 2/10 showed                    wbc 4.7, hb 13.7, plt 194 From 6/10 showed   gluc 88, cr 0.80,   alt 37, chol 168, tg 40, hdl 65, ldl-c 95 From 8/10 showed                                 tsh 0.43, vit d 32 From 3/12 showed                          ua neg From 10/14 showed                          uric 6.8 From 3/15 showed   gluc 90, cr 0.80, gfr>60, alt 14, chol 174, tg 41, hdl 72, ldl-c 94,  wbc 4.4, hb 11.4, plt 218, tsh 0.27 From 3/15 showed                ck/trop neg From 3/15 showed   gluc 99, cr 0.70, gfr>60, alt 12,           meta neg, luis/renin nl, serum catechols neg, urine catechols neg From 12/15 showed gluc 91, cr 0.70, gfr>60, alt 18, chol 176, tg 45, hdl 72, ldl-c 96,  wbc 4.5, hb 12.9, plt 201, tsh 0.38, ua neg Results for orders placed or performed in visit on 11/14/18 CBC W/O DIFF Result Value Ref Range WBC 4.6 4.0 - 11.0 K/uL  
 RBC 4.15 3.80 - 5.20 M/uL  
 HGB 13.4 11.7 - 16.0 g/dL HCT 40.0 35.1 - 48.0 % MCV 96 (H) 80 - 95 fL  
 MCH 32 26 - 34 pg MCHC 34 31 - 36 g/dL  
 RDW 13.4 10.0 - 15.5 % PLATELET 327 896 - 457 K/uL MPV 11.2 9.0 - 13.0 fL  
LIPID PANEL Result Value Ref Range Triglyceride 35 (L) 40 - 149 mg/dL HDL Cholesterol 69 (H) 40 - 59 mg/dL Cholesterol, total 172 110 - 200 mg/dL CHOLESTEROL/HDL 2.5 0.0 - 5.0 LDL, calculated 96 50 - 99 mg/dL VLDL, calculated 7 (L) 8 - 30 mg/dL METABOLIC PANEL, COMPREHENSIVE Result Value Ref Range Glucose 89 70 - 99 mg/dL BUN 15 6 - 22 mg/dL Creatinine 0.8 0.5 - 1.2 mg/dL Sodium 139 133 - 145 mmol/L Potassium 4.0 3.5 - 5.5 mmol/L Chloride 102 98 - 110 mmol/L  
 CO2 25 20 - 32 mmol/L  
 AST (SGOT) 19 10 - 37 U/L  
 ALT (SGPT) 16 5 - 40 U/L Alk. phosphatase 102 25 - 115 U/L Bilirubin, total 0.4 0.2 - 1.2 mg/dL Calcium 9.0 8.4 - 10.5 mg/dL Protein, total 7.3 6.4 - 8.3 g/dL Albumin 4.4 3.5 - 5.0 g/dL A-G Ratio 1.5 1.1 - 2.6 ratio Globulin 2.9 2.0 - 4.0 g/dL Anion gap 12.0 mmol/L  
 GFRAA >60.0 >60.0 GFRNA >60.0 >60.0 TSH 3RD GENERATION Result Value Ref Range TSH 0.98 0.27 - 4.20 mcU/mL T4, FREE Result Value Ref Range T4, Free 1.2 0.9 - 1.8 ng/dL Patient Active Problem List  
Diagnosis Code  Varicose veins of lower extremities with other complications K81.973  Anxiety F41.9  Essential hypertension I10  Leg edema, left R60.0  Acquired hypothyroidism E03.9  Hyperplastic colonic polyp 7/17 Dr Yaquelin Morocho K63.5  Severe obesity (BMI 35.0-39. 9) with comorbidity ([address] E66.01 Assessment and plan: 1. HTN. Continue current 2. Thyroid. F/U Dr Deirdre Holbrook 3. Varicosities. F/U [facility] 4. Left leg discomfort. Consider emg although decided to hold off for now 5. Mammogram ordered 6. Diflucan given 7. Obesity. Lifestyle and dietary measures. Portion control reiterated. 8.  Declined flu shot RTC 12/19 Above conditions discussed at length and patient vocalized understanding. All questions answered to patient satisfaction

## 2018-12-06 ENCOUNTER — OFFICE VISIT (OUTPATIENT)
Dept: INTERNAL MEDICINE CLINIC | Age: 58
End: 2018-12-06

## 2018-12-06 VITALS
WEIGHT: 214 LBS | OXYGEN SATURATION: 98 % | RESPIRATION RATE: 14 BRPM | HEIGHT: 63 IN | DIASTOLIC BLOOD PRESSURE: 76 MMHG | HEART RATE: 61 BPM | SYSTOLIC BLOOD PRESSURE: 118 MMHG | BODY MASS INDEX: 37.92 KG/M2 | TEMPERATURE: 98.2 F

## 2018-12-06 DIAGNOSIS — K63.5 HYPERPLASTIC COLONIC POLYP, UNSPECIFIED PART OF COLON: ICD-10-CM

## 2018-12-06 DIAGNOSIS — Z00.00 PHYSICAL EXAM: Primary | ICD-10-CM

## 2018-12-06 DIAGNOSIS — R60.0 LEG EDEMA, LEFT: ICD-10-CM

## 2018-12-06 DIAGNOSIS — F41.9 ANXIETY: ICD-10-CM

## 2018-12-06 DIAGNOSIS — I10 ESSENTIAL HYPERTENSION: ICD-10-CM

## 2018-12-06 DIAGNOSIS — Z12.31 SCREENING MAMMOGRAM, ENCOUNTER FOR: ICD-10-CM

## 2018-12-06 DIAGNOSIS — E66.01 SEVERE OBESITY (BMI 35.0-39.9) WITH COMORBIDITY (HCC): ICD-10-CM

## 2018-12-06 DIAGNOSIS — E03.9 ACQUIRED HYPOTHYROIDISM: ICD-10-CM

## 2018-12-06 DIAGNOSIS — B37.31 YEAST VAGINITIS: ICD-10-CM

## 2018-12-06 PROBLEM — E66.9 OBESITY (BMI 30-39.9): Status: RESOLVED | Noted: 2017-04-18 | Resolved: 2018-12-06

## 2018-12-06 RX ORDER — FLUCONAZOLE 150 MG/1
150 TABLET ORAL DAILY
Qty: 1 TAB | Refills: 0 | Status: SHIPPED | OUTPATIENT
Start: 2018-12-06 | End: 2018-12-07

## 2018-12-06 NOTE — PROGRESS NOTES
1. Have you been to the ER, urgent care clinic or hospitalized since your last visit? NO.  
 
2. Have you seen or consulted any other health care providers outside of the [address] [place] [address] since your last visit (Include any pap smears or colon screening)? NO Do you have an Advanced Directive? NO Would you like information on Advanced Directives? NO Chief Complaint Patient presents with  Physical  
  labs

## 2019-04-12 DIAGNOSIS — J40 BRONCHITIS: ICD-10-CM

## 2019-04-15 RX ORDER — ESTROGENS, CONJUGATED 0.62 MG/1
TABLET, FILM COATED ORAL
Qty: 90 TAB | Refills: 3 | Status: SHIPPED | OUTPATIENT
Start: 2019-04-15 | End: 2020-04-29

## 2019-05-07 NOTE — TELEPHONE ENCOUNTER
Last Visit: 12/6/18 with MD Jenny Gerard  Next Appointment: return in 1 year  Previous Refill Encounter(s): 11/29/18 #30 with 4 refills    Requested Prescriptions     Pending Prescriptions Disp Refills    carvedilol (COREG CR) 40 mg CR capsule [Pharmacy Med Name: CARVEDILOL ER 40 MG CAPSULE] 90 Cap 3     Sig: take 1 capsule by mouth once daily

## 2019-05-08 RX ORDER — CARVEDILOL PHOSPHATE 40 MG/1
CAPSULE, EXTENDED RELEASE ORAL
Qty: 90 CAP | Refills: 3 | Status: SHIPPED | OUTPATIENT
Start: 2019-05-08 | End: 2019-10-06 | Stop reason: SDUPTHER

## 2019-07-30 ENCOUNTER — OFFICE VISIT (OUTPATIENT)
Dept: VASCULAR SURGERY | Age: 59
End: 2019-07-30

## 2019-07-30 VITALS
DIASTOLIC BLOOD PRESSURE: 64 MMHG | HEIGHT: 63 IN | BODY MASS INDEX: 37.92 KG/M2 | WEIGHT: 214 LBS | RESPIRATION RATE: 17 BRPM | SYSTOLIC BLOOD PRESSURE: 120 MMHG

## 2019-07-30 DIAGNOSIS — R60.0 LEG EDEMA, LEFT: Primary | ICD-10-CM

## 2019-07-30 DIAGNOSIS — M79.672 LEFT FOOT PAIN: ICD-10-CM

## 2019-07-30 NOTE — PROGRESS NOTES
1. Have you been to an emergency room or urgent care clinic since your last visit? No    Hospitalized since your last visit? If yes, where, when, and reason for visit? NO  2. Have you seen or consulted any other health care providers outside of the [facility] since your last visit including any procedures, health maintenance items. If yes, where, when and reason for visit?  NO

## 2019-07-30 NOTE — PROGRESS NOTES
Paulina Wolff    Chief Complaint   Patient presents with    Leg Pain    Swelling       History and Physical    35-year-old female here today for evaluation of left leg swelling. She has a history of a left greater saphenous vein closure approximately 2 years ago. Post procedure her edema had resolved completely she is very happy the results. She works in the [place] area of [employer] on a 4 AM to 1 PM shift. She on her feet wearing her stockings every day. She is getting some progressive swelling again but she wakes up at night with pain in her foot and lower extremity. No chest pain she is occasionally has some shortness of breath she sees her primary care provider. She has been treated for Graves' disease    Past Medical History:   Diagnosis Date    Allergic rhinitis     Anxiety     WADE-7 was 6/21    Chronic venous hypertension without complications 2/19/0487    Colon polyp 06/2017    Dr Geovani Ferris Depression     1980s treated w zoloft    FHx: heart disease     Grave's disease 2003    and hyperthyroidism Dr. Gus Turner Hypertension     neg w/u secondary causes 3/15 Dr Knutson Risk Hypovitaminosis D     Morbid obesity ([facility] [address]     peak weight 225 lbs, bmi 39.1 from 2/15    Subacromial bursitis     Venous reflux     12/14 EMILY Lopez     Patient Active Problem List   Diagnosis Code    Varicose veins of lower extremities with other complications S71.322    Anxiety F41.9    Essential hypertension I10    Leg edema, left R60.0    Acquired hypothyroidism E03.9    Hyperplastic colonic polyp 7/17 Dr Alver Heimlich K63.5    Severe obesity (BMI 35.0-39. 9) with comorbidity ([address] E66.01     Past Surgical History:   Procedure Laterality Date    CARDIAC SURG PROCEDURE UNLIST  1/14    negative thallium ef 65%    HX COLONOSCOPY      Dr Alver Heimlich 7/3/12 negative; 6/21/17 polyp    HX CHADWICK AND BSO      NEUROLOGICAL PROCEDURE UNLISTED  3/15    ct head negative    VASCULAR SURGERY PROCEDURE UNLIST  2017    left leg saphenous vein closure Dr Kenya Hogan     Current Outpatient Medications   Medication Sig Dispense Refill    carvedilol (COREG CR) 40 mg CR capsule take 1 capsule by mouth once daily 90 Cap 3    PREMARIN 0.625 mg tablet take 1 tablet by mouth once daily 90 Tab 3    amLODIPine (NORVASC) 10 mg tablet take 1 tablet by mouth once daily 90 Tab 3    lisinopril-hydroCHLOROthiazide (PRINZIDE, ZESTORETIC) 20-12.5 mg per tablet take 1 tablet by mouth every morning for blood pressure 90 Tab 1    Cholecalciferol, Vitamin D3, (VITAMIN D) 5,000 unit Tab Take  by mouth. No Known Allergies    Review of Systems    A full review of systems was completed times ten organ systems and was deemed negative unless otherwise mentioned in the HPI. Physical   Visit Vitals  /64 (BP 1 Location: Left arm, BP Patient Position: Sitting)   Resp 17   Ht 5' 3\" (1.6 m)   Wt 214 lb (97.1 kg)   BMI 37.91 kg/m²       Appears well today she is in good spirits she looks healthy today  Head is normocephalic  Neck no JVD  Chest clear  Cardiac regular  Abdomen soft nontender  She was wearing her stockings prior to coming in today as well. Pulses are intact there is no arterial deficit  Not much in the way of edema today but she deftly has some skin staining    Impression/Plan:     ICD-10-CM ICD-9-CM    1. Leg edema, left R60.0 782.3 DUPLEX LOWER EXT VENOUS LEFT   2. Left foot pain M79.672 729.5 DUPLEX LOWER EXT VENOUS LEFT     We will check a left leg venous study including DVT and reflux  We will follow-up with results  If study completely negative we will consider podiatry evaluation. No orders of the defined types were placed in this encounter. Follow-up and Dispositions    · Return in about 3 weeks (around 8/20/2019). Marty Bella MD    PLEASE NOTE:  This document has been produced using voice recognition software. Unrecognized errors in transcription may be present.

## 2019-08-30 ENCOUNTER — DOCUMENTATION ONLY (OUTPATIENT)
Dept: VASCULAR SURGERY | Age: 59
End: 2019-08-30

## 2019-08-30 ENCOUNTER — OFFICE VISIT (OUTPATIENT)
Dept: VASCULAR SURGERY | Age: 59
End: 2019-08-30

## 2019-08-30 VITALS
WEIGHT: 214 LBS | HEART RATE: 70 BPM | SYSTOLIC BLOOD PRESSURE: 140 MMHG | RESPIRATION RATE: 17 BRPM | HEIGHT: 63 IN | DIASTOLIC BLOOD PRESSURE: 80 MMHG | BODY MASS INDEX: 37.92 KG/M2

## 2019-08-30 DIAGNOSIS — M79.672 LEFT FOOT PAIN: Primary | ICD-10-CM

## 2019-08-30 NOTE — PROGRESS NOTES
Patient came in to see Dr Rafael Harvey today and appointments were scheduled for her. She called back and stated that she did not want to keep the appointments. She stated she did not want to go to anymore appointments to any more doctors than what she already has scheduled. I canceled the appointments with Chaya Anderson and Dr. Chu Montanez.

## 2019-08-30 NOTE — PROGRESS NOTES
Tania Shelby    Chief Complaint   Patient presents with    Leg Pain    Swelling       History and Physical    51-year-old female following up regarding a work-up for pain in her left lower extremity. She has a history of left greater saphenous vein ablation many years ago she had resolution of her reflux symptoms that time. She works full-time at [employer] wears her stockings at work. Her reason for this work-up she is had a return of pain. This is at the left lateral ankle where she is now had surface some dry skin and small pitted ulceration. No injury no recent bite. Past Medical History:   Diagnosis Date    Allergic rhinitis     Anxiety     WADE-7 was 6/21    Chronic venous hypertension without complications 6/72/4128    Colon polyp 06/2017    Dr Yelitza Neri Depression     1980s treated w zoloft    FHx: heart disease     Grave's disease 2003    and hyperthyroidism Dr. Kathleen Tse Hypertension     neg w/u secondary causes 3/15 Dr Bea Persaud Hypovitaminosis D     Morbid obesity ([facility] [address]     peak weight 225 lbs, bmi 39.1 from 2/15    Subacromial bursitis     Venous reflux     12/14 EMILY Lopez     Patient Active Problem List   Diagnosis Code    Varicose veins of lower extremities with other complications L25.321    Anxiety F41.9    Essential hypertension I10    Leg edema, left R60.0    Acquired hypothyroidism E03.9    Hyperplastic colonic polyp 7/17 Dr Annalisa Carias K63.5    Severe obesity (BMI 35.0-39. 9) with comorbidity ([address] E66.01     Past Surgical History:   Procedure Laterality Date    CARDIAC SURG PROCEDURE UNLIST  1/14    negative thallium ef 65%    HX COLONOSCOPY      Dr Annalisa Carias 7/3/12 negative; 6/21/17 polyp    HX CHADWICK AND BSO      NEUROLOGICAL PROCEDURE UNLISTED  3/15    ct head negative    VASCULAR SURGERY PROCEDURE UNLIST  2017    left leg saphenous vein closure Dr Gonzalo Clemente     Current Outpatient Medications   Medication Sig Dispense Refill    carvedilol (COREG CR) 40 mg CR capsule take 1 capsule by mouth once daily 90 Cap 3    PREMARIN 0.625 mg tablet take 1 tablet by mouth once daily 90 Tab 3    amLODIPine (NORVASC) 10 mg tablet take 1 tablet by mouth once daily 90 Tab 3    lisinopril-hydroCHLOROthiazide (PRINZIDE, ZESTORETIC) 20-12.5 mg per tablet take 1 tablet by mouth every morning for blood pressure 90 Tab 1    Cholecalciferol, Vitamin D3, (VITAMIN D) 5,000 unit Tab Take  by mouth. No Known Allergies    Review of Systems    A full review of systems was completed times ten organ systems and was deemed negative unless otherwise mentioned in the HPI. Physical   Visit Vitals  /80 (BP 1 Location: Left arm, BP Patient Position: Sitting)   Pulse 70   Resp 17   Ht 5' 3\" (1.6 m)   Wt 214 lb (97.1 kg)   BMI 37.91 kg/m²       Pleasant youthful appearing 61  Head is normocephalic  Neck no JVD  Chest is clear  Cardiac regular  Abdomen soft flat nontender  Lower extremities with no arterial vascular deficit  No swelling today her skin is intact and healthy. The exception of this area just bigger than a quarter sized area left ankle dry with some small superficial opening no drainage no infection  Vascular study reviewed she has no arterial deficit, her greater saphenous vein remains ablated, small saphenous vein is no reflux is small in size no varicosities seen. Reflux noted that common femoral    Impression/Plan:     ICD-10-CM ICD-9-CM    1. Left foot pain M79.672 729.5      Will refer to podiatry regarding pain at the left ankle  Will refer to dermatology to this painful rash left ankle  I do not see any vascular findings to indicate this is a source for this. She is a reliable patient I think this should be pursued to give her relief for this pain and now skin changes. No orders of the defined types were placed in this encounter. Follow-up and Dispositions    · Return if symptoms worsen or fail to improve.          Kateri Goltz, MD    PLEASE NOTE:  This document has been produced using voice recognition software. Unrecognized errors in transcription may be present.

## 2019-08-30 NOTE — PROGRESS NOTES
1. Have you been to an emergency room or urgent care clinic since your last visit? No    Hospitalized since your last visit? If yes, where, when, and reason for visit? No  2. Have you seen or consulted any other health care providers outside of the [facility] since your last visit including any procedures, health maintenance items.  If yes, where, when and reason for visit? no

## 2019-10-30 ENCOUNTER — TELEPHONE (OUTPATIENT)
Dept: INTERNAL MEDICINE CLINIC | Age: 59
End: 2019-10-30

## 2019-10-30 DIAGNOSIS — R05.9 COUGH: Primary | ICD-10-CM

## 2019-10-30 RX ORDER — CODEINE PHOSPHATE AND GUAIFENESIN 10; 100 MG/5ML; MG/5ML
5 SOLUTION ORAL
Qty: 100 ML | Refills: 0 | Status: SHIPPED | OUTPATIENT
Start: 2019-10-30 | End: 2019-11-06

## 2019-10-30 NOTE — TELEPHONE ENCOUNTER
Pt calling asking for medication for cough. Says she has had it for several day. No fever.     Is there a time you can see her or will you call in?

## 2019-12-17 ENCOUNTER — LAB ONLY (OUTPATIENT)
Dept: INTERNAL MEDICINE CLINIC | Age: 59
End: 2019-12-17

## 2019-12-17 DIAGNOSIS — E03.9 ACQUIRED HYPOTHYROIDISM: ICD-10-CM

## 2019-12-17 DIAGNOSIS — I10 ESSENTIAL HYPERTENSION: Primary | ICD-10-CM

## 2019-12-17 DIAGNOSIS — F41.9 ANXIETY: ICD-10-CM

## 2019-12-17 DIAGNOSIS — Z00.00 ROUTINE GENERAL MEDICAL EXAMINATION AT A HEALTH CARE FACILITY: ICD-10-CM

## 2019-12-18 LAB
A-G RATIO,AGRAT: 1.3 RATIO (ref 1.1–2.6)
ABSOLUTE LYMPHOCYTE COUNT, 10803: 2.4 K/UL (ref 1–4.8)
ALBUMIN SERPL-MCNC: 4.4 G/DL (ref 3.5–5)
ALP SERPL-CCNC: 104 U/L (ref 25–115)
ALT SERPL-CCNC: 17 U/L (ref 5–40)
ANION GAP SERPL CALC-SCNC: 14 MMOL/L
AST SERPL W P-5'-P-CCNC: 20 U/L (ref 10–37)
BASOPHILS # BLD: 0 K/UL (ref 0–0.2)
BASOPHILS NFR BLD: 0 % (ref 0–2)
BILIRUB SERPL-MCNC: 0.5 MG/DL (ref 0.2–1.2)
BUN SERPL-MCNC: 11 MG/DL (ref 6–22)
CALCIUM SERPL-MCNC: 9.3 MG/DL (ref 8.4–10.5)
CHLORIDE SERPL-SCNC: 102 MMOL/L (ref 98–110)
CHOLEST SERPL-MCNC: 165 MG/DL (ref 110–200)
CO2 SERPL-SCNC: 25 MMOL/L (ref 20–32)
CREAT SERPL-MCNC: 0.9 MG/DL (ref 0.5–1.2)
EOSINOPHIL # BLD: 0.1 K/UL (ref 0–0.5)
EOSINOPHIL NFR BLD: 1 % (ref 0–6)
ERYTHROCYTE [DISTWIDTH] IN BLOOD BY AUTOMATED COUNT: 13.2 % (ref 10–15.5)
GFRAA, 66117: >60
GFRNA, 66118: >60
GLOBULIN,GLOB: 3.4 G/DL (ref 2–4)
GLUCOSE SERPL-MCNC: 97 MG/DL (ref 70–99)
GRANULOCYTES,GRANS: 39 % (ref 40–75)
HCT VFR BLD AUTO: 43 % (ref 35.1–48)
HDLC SERPL-MCNC: 2.4 MG/DL (ref 0–5)
HDLC SERPL-MCNC: 70 MG/DL
HGB BLD-MCNC: 13.8 G/DL (ref 11.7–16)
LDL/HDL RATIO,LDHD: 1.3
LDLC SERPL CALC-MCNC: 88 MG/DL (ref 50–99)
LYMPHOCYTES, LYMLT: 50 % (ref 20–45)
MCH RBC QN AUTO: 31 PG (ref 26–34)
MCHC RBC AUTO-ENTMCNC: 32 G/DL (ref 31–36)
MCV RBC AUTO: 98 FL (ref 81–99)
MONOCYTES # BLD: 0.4 K/UL (ref 0.1–1)
MONOCYTES NFR BLD: 9 % (ref 3–12)
NEUTROPHILS # BLD AUTO: 1.8 K/UL (ref 1.8–7.7)
NON-HDL CHOLESTEROL, 011976: 95 MG/DL
PLATELET # BLD AUTO: 201 K/UL (ref 140–440)
PMV BLD AUTO: 12.1 FL (ref 9–13)
POTASSIUM SERPL-SCNC: 4.1 MMOL/L (ref 3.5–5.5)
PROT SERPL-MCNC: 7.8 G/DL (ref 6.4–8.3)
RBC # BLD AUTO: 4.4 M/UL (ref 3.8–5.2)
SODIUM SERPL-SCNC: 141 MMOL/L (ref 133–145)
T4 FREE SERPL-MCNC: 1.2 NG/DL (ref 0.9–1.8)
TRIGL SERPL-MCNC: 33 MG/DL (ref 40–149)
TSH SERPL DL<=0.005 MIU/L-ACNC: 1.24 MCU/ML (ref 0.27–4.2)
VLDLC SERPL CALC-MCNC: 7 MG/DL (ref 8–30)
WBC # BLD AUTO: 4.7 K/UL (ref 4–11)

## 2019-12-23 NOTE — TELEPHONE ENCOUNTER
Called and spoke to patient about the below message. Patient verbalized understanding with no additional questions. Per Rosetta Shea to schedule a 1 week follow up with PCP Dr. Jenny Gerard. Patient stated that she will have to call back to schedule due to see is at work and unable to make the appointment at this time. Advised patient when she calls back to let the  know 1 week follow up with Dr. Jenny Gerard per Rex Heaton. [id number]

## 2020-01-02 NOTE — PROGRESS NOTES
61 y.o. BLACK OR  female who presents for evaluation. No cardiovascular complaints. Continues to go to [facility] 2x/week doing an hour class or treadmill for 30 min. They also recently bought an elliptical    No gi or gu complaints. She has not gone for mammo in years as she 'keeps forgetting'    She has been f/u with vascular regularly, she's using the copper infused stockings which have helped a lot.   A sore showed up in the right medial distal leg but that has healed with local care    No success with attempts at wt loss    Vitals 1/7/2020 8/30/2019 7/30/2019 12/6/2018 9/26/2018   Weight 221 lb 214 lb 214 lb 214 lb 212 lb 12.8 oz     Past Medical History:   Diagnosis Date    Allergic rhinitis     Anxiety     WADE-7 was 6/21    Chronic venous hypertension without complications 2/63/9935    Colon polyp 06/2017    Dr Dafne Cordero Depression 1980s    zoloft    FHx: heart disease     Grave's disease 2003    and hyperthyroidism Dr. Waldemar Matt Hypertension     neg w/u secondary causes 3/15 Dr Trudy Krueger Hypovitaminosis D     Morbid obesity ([facility] [address]     peak weight 225 lbs, bmi 39.1 from 2/15; has seen nutritionist in past    Subacromial bursitis     Venous reflux     12/14 EMILY Bynum     Past Surgical History:   Procedure Laterality Date    CARDIAC SURG PROCEDURE UNLIST  1/14    negative NST ef 65%    HX COLONOSCOPY      Dr Akilah Lynne 7/3/12 negative; 6/21/17 polyp    HX CHADWICK AND BSO      NEUROLOGICAL PROCEDURE UNLISTED  3/15    ct head negative    VASCULAR SURGERY PROCEDURE UNLIST  2017    left leg saphenous vein closure Dr Tammi Stein History     Socioeconomic History    Marital status:      Spouse name: Not on file    Number of children: 3    Years of education: Not on file    Highest education level: Not on file   Occupational History    Occupation: works at [employer] [employer] resource strain: Not on file    Food insecurity:     Worry: Not on file     Inability: Not on file    Transportation needs:     Medical: Not on file     Non-medical: Not on file   Tobacco Use    Smoking status: Never Smoker    Smokeless tobacco: Never Used   Substance and Sexual Activity    Alcohol use: No    Drug use: No    Sexual activity: Not Currently   Lifestyle    Physical activity:     Days per week: Not on file     Minutes per session: Not on file    Stress: Not on file   Relationships    Social connections:     Talks on phone: Not on file     Gets together: Not on file     Attends Synagogue service: Not on file     Active member of club or organization: Not on file     Attends meetings of clubs or organizations: Not on file     Relationship status: Not on file    Intimate partner violence:     Fear of current or ex partner: Not on file     Emotionally abused: Not on file     Physically abused: Not on file     Forced sexual activity: Not on file   Other Topics Concern    Not on file   Social History Narrative    Not on file      Family History   Problem Relation Age of Onset    Heart Disease Father     Hypertension Sister     Heart Disease Mother     Seizures Sister     Sudden Death Neg Hx     Heart Attack Neg Hx        There is no immunization history on file for this patient. Current Outpatient Medications   Medication Sig    carvedilol (COREG CR) 40 mg CR capsule take 1 capsule by mouth once daily    PREMARIN 0.625 mg tablet take 1 tablet by mouth once daily    amLODIPine (NORVASC) 10 mg tablet take 1 tablet by mouth once daily    Cholecalciferol, Vitamin D3, (VITAMIN D) 5,000 unit Tab Take  by mouth.  lisinopril-hydroCHLOROthiazide (PRINZIDE, ZESTORETIC) 20-12.5 mg per tablet take 1 tablet by mouth every morning for blood pressure     No current facility-administered medications for this visit. No Known Allergies    REVIEW OF SYSTEMS: gyn 2015, mammo 2011?  [place] 6/17 Dr Theda Bence  Ophtho  no vision change or eye pain  Oral  no mouth pain, tongue or tooth problems  Ears  no hearing loss, ear pain, fullness, no swallowing problems  Cardiac  no CP, PND, orthopnea, edema, palpitations or syncope  Chest  no breast masses  Resp  no wheezing, chronic coughing, dyspnea  GI  no heartburn, nausea, vomiting, change in bowel habits, bleeding, hemorrhoids  Urinary  no dysuria, hematuria, flank pain, urgency, frequency  Genitals  no genital lesions, discharge, masses, ulceration, warts    Visit Vitals  /76   Pulse 73   Temp 98.3 °F (36.8 °C) (Oral)   Resp 14   Ht 5' 3\" (1.6 m)   Wt 221 lb (100.2 kg)   SpO2 97%   BMI 39.15 kg/m²   A&O x3  Affect is appropriate. Mood stable  No apparent distress  Anicteric, no JVD, adenopathy or thyromegaly. No carotid bruits or radiated murmur  Lungs clear to auscultation, no wheezes or rales  Heart showed regular rate and rhythm. No murmur, rubs, gallops  Abdomen soft nontender, no hepatosplenomegaly or masses. Declined rectal exam as she will be seeing the specialist  Extremities without edema. Pulses 1-2+ symmetrically.      LABS  From 11/07 showed gluc 92, cr 0.90,  alt 30, chol 164, tg 30, hdl 51, ldl-c 107, wbc 4.5, hb 12.7, plt 190, tsh 0.50  From 2/10 showed                    wbc 4.7, hb 13.7, plt 194  From 6/10 showed   gluc 88, cr 0.80,   alt 37, chol 168, tg 40, hdl 65, ldl-c 95  From 8/10 showed                                 tsh 0.43, vit d 27  From 3/12 showed                          ua neg  From 10/14 showed                          uric 6.8  From 3/15 showed   gluc 90, cr 0.80, gfr>60, alt 14, chol 174, tg 41, hdl 72, ldl-c 94,  wbc 4.4, hb 11.4, plt 218, tsh 0.27  From 3/15 showed                ck/trop neg  From 3/15 showed   gluc 99, cr 0.70, gfr>60, alt 12,           meta neg, luis/renin nl, serum catechols neg, urine catechols neg  From 12/15 showed gluc 91, cr 0.70, gfr>60, alt 18, chol 176, tg 45, hdl 72, ldl-c 96,  wbc 4.5, hb 12.9, plt 201, tsh 0.38, ua neg  From 12/18 showed gluc 89, cr 0.80, gfr>60, alt 16, chol 172, tg 35, hdl 69, ldl-c 96,  wbc 4.6, hb 13.4, plt 200, tsh 0.98, ft4 1.20  From 12/19 showed gluc 97, cr 0.90, gfr>60, alt 17, chol 165, tg 33, hdl 70, ldl-c 95,  wbc 4.7, hb 13.8, plt 201, tsh 1.24, ft4 1.20    Patient Active Problem List   Diagnosis Code    Varicose veins of lower extremities with other complications M48.813    Anxiety F41.9    Essential hypertension I10    Leg edema, left R60.0    Acquired hypothyroidism E03.9    Hyperplastic colonic polyp 7/17 Dr Akilah Lynne K63.5    Severe obesity (BMI 35.0-39. 9) with comorbidity ([facility] [address] E66.01     Assessment and plan:  1. HTN. Continue current  2. Thyroid. F/U Dr Lizzie Diehl   3. Vascular. F/U EMILY Lopez  4. Obesity. Lifestyle and dietary measures. Portion control reiterated. 5. Mammogram ordered and we discussed importance of routine screening, she promises to get done  6. Declined flu shot and shingrix despite another long discussion        RTC 1/21    Above conditions discussed at length and patient vocalized understanding.   All questions answered to patient satisfaction

## 2020-01-07 ENCOUNTER — OFFICE VISIT (OUTPATIENT)
Dept: INTERNAL MEDICINE CLINIC | Age: 60
End: 2020-01-07

## 2020-01-07 VITALS
HEART RATE: 73 BPM | BODY MASS INDEX: 39.16 KG/M2 | WEIGHT: 221 LBS | RESPIRATION RATE: 14 BRPM | SYSTOLIC BLOOD PRESSURE: 112 MMHG | TEMPERATURE: 98.3 F | DIASTOLIC BLOOD PRESSURE: 76 MMHG | OXYGEN SATURATION: 97 % | HEIGHT: 63 IN

## 2020-01-07 DIAGNOSIS — Z12.31 SCREENING MAMMOGRAM, ENCOUNTER FOR: ICD-10-CM

## 2020-01-07 DIAGNOSIS — I10 ESSENTIAL HYPERTENSION: ICD-10-CM

## 2020-01-07 DIAGNOSIS — E66.01 SEVERE OBESITY (BMI 35.0-39.9) WITH COMORBIDITY (HCC): ICD-10-CM

## 2020-01-07 DIAGNOSIS — E03.9 ACQUIRED HYPOTHYROIDISM: ICD-10-CM

## 2020-01-07 DIAGNOSIS — Z00.00 PHYSICAL EXAM: Primary | ICD-10-CM

## 2020-01-07 DIAGNOSIS — K63.5 HYPERPLASTIC COLONIC POLYP, UNSPECIFIED PART OF COLON: ICD-10-CM

## 2020-01-07 RX ORDER — CARVEDILOL PHOSPHATE 40 MG/1
CAPSULE, EXTENDED RELEASE ORAL
Qty: 30 CAP | Refills: 11 | Status: SHIPPED | OUTPATIENT
Start: 2020-01-07 | End: 2021-01-19

## 2020-01-07 NOTE — PROGRESS NOTES
Rekha Fournier presents today for   Chief Complaint   Patient presents with    Physical     labs              Depression Screening:  3 most recent PHQ Screens 1/7/2020   Little interest or pleasure in doing things Not at all   Feeling down, depressed, irritable, or hopeless Not at all   Total Score PHQ 2 0       Learning Assessment:  Learning Assessment 8/30/2019   PRIMARY LEARNER Patient   BARRIERS PRIMARY LEARNER -   CO-LEARNER CAREGIVER -   PRIMARY LANGUAGE ENGLISH   LEARNER PREFERENCE PRIMARY LISTENING   ANSWERED BY domenica   RELATIONSHIP SELF       Abuse Screening:  Abuse Screening Questionnaire 3/16/2017   Do you ever feel afraid of your partner? N   Are you in a relationship with someone who physically or mentally threatens you? N   Is it safe for you to go home? Y       Fall Risk  No flowsheet data found. Health Maintenance Due   Topic Date Due    DTaP/Tdap/Td series (1 - Tdap) 03/08/1971    Shingrix Vaccine Age 50> (1 of 2) 03/08/2010    BREAST CANCER SCRN MAMMOGRAM  03/08/2010    Influenza Age 5 to Adult  08/01/2019         Coordination of Care:  1. Have you been to the ER, urgent care clinic since your last visit? Hospitalized since your last visit? no    2. Have you seen or consulted any other health care providers outside of the [address] [place] [address] since your last visit? Include any pap smears or colon screening.  no

## 2020-02-11 DIAGNOSIS — I87.2 VENOUS REFLUX: ICD-10-CM

## 2020-02-11 DIAGNOSIS — I83.013 VENOUS ULCER OF ANKLE, RIGHT (HCC): Primary | ICD-10-CM

## 2020-02-11 DIAGNOSIS — L97.319 VENOUS ULCER OF ANKLE, RIGHT (HCC): Primary | ICD-10-CM

## 2020-02-11 NOTE — PROGRESS NOTES
Attempted to see pt for OT eval however pt going off floor for CT. Will re-attempt tomorrow. Received call from podiatry with right leg ulcer  They are initiating wound care but have requested her to be seen given her history of the same with the left leg in the past including reflux and h/o closure  Will arrange right leg reflux study and follow up visit  Can assist in wound care if helpful  Our office will contact patient directly for scheduling

## 2020-03-23 ENCOUNTER — TELEPHONE (OUTPATIENT)
Dept: VASCULAR SURGERY | Age: 60
End: 2020-03-23

## 2020-03-23 NOTE — TELEPHONE ENCOUNTER
Called the patient and per patient she needs to be seen because she is  having  Symptoms,pt has ulcer that has developed, pt would like to be seen. Pt verbalized understanding.

## 2020-03-24 ENCOUNTER — TELEPHONE (OUTPATIENT)
Dept: VASCULAR SURGERY | Age: 60
End: 2020-03-24

## 2020-03-24 NOTE — TELEPHONE ENCOUNTER
Please call patient back concerning her follow up on 03.31/20. she said she spoke with you about doing a telephone visit, but with her symptoms, you suggested that she come into the office. She now has another idea as to how to jeffrey this follow up. Please call  [phone].

## 2020-03-24 NOTE — TELEPHONE ENCOUNTER
Called the patient and after second thought would like to do a call visit if possible . Discussed with provider and she will be virtual visit . Advised the patient there would be regular copay just like coming in . Pt is good with all this and will be left on schedule. Gave to front office to schedule.

## 2020-03-31 ENCOUNTER — OFFICE VISIT (OUTPATIENT)
Dept: VASCULAR SURGERY | Age: 60
End: 2020-03-31

## 2020-03-31 VITALS
RESPIRATION RATE: 17 BRPM | SYSTOLIC BLOOD PRESSURE: 130 MMHG | WEIGHT: 221 LBS | BODY MASS INDEX: 39.16 KG/M2 | DIASTOLIC BLOOD PRESSURE: 80 MMHG | HEIGHT: 63 IN

## 2020-03-31 DIAGNOSIS — L97.319 VENOUS ULCER OF ANKLE, RIGHT (HCC): Primary | ICD-10-CM

## 2020-03-31 DIAGNOSIS — I83.013 VENOUS ULCER OF ANKLE, RIGHT (HCC): Primary | ICD-10-CM

## 2020-03-31 RX ORDER — MUPIROCIN 20 MG/G
OINTMENT TOPICAL
Qty: 1 TUBE | Refills: 3 | Status: SHIPPED | OUTPATIENT
Start: 2020-03-31 | End: 2021-01-08

## 2020-03-31 NOTE — PROGRESS NOTES
Bactroban ointment has been sent to [facility] per verbal order Dr. Loistine Dakin.

## 2020-03-31 NOTE — PROGRESS NOTES
Giorgio Hernandez    Chief Complaint   Patient presents with    Wound Check       History and Physical    Ms. Nhung Quintero is here today with a new ulcer. She had been seen by Dr. Ida Abbott and placed on antibiotic. She tells me with the antibiotic and compression is been better. She denies any knowledge of trauma she not had recent cold no flulike symptoms no diarrhea. She has no autoimmune disorder. She had venous Doppler of the right lower extremity. Past Medical History:   Diagnosis Date    Allergic rhinitis     Anxiety     WADE-7 was 6/21    Chronic venous hypertension without complications 4/86/2485    Colon polyp 06/2017    Dr Rob German Depression 1980s    zoloft    FHx: heart disease     Grave's disease 2003    and hyperthyroidism Dr. Dmitry Ga Hypertension     neg w/u secondary causes 3/15 Dr Reva Carlos Hypovitaminosis D     Morbid obesity ([address]     peak weight 225 lbs, bmi 39.1 from 2/15; has seen nutritionist in past    Subacromial bursitis     Venous reflux     12/14 EMILY Cardenas     Patient Active Problem List   Diagnosis Code    Varicose veins of lower extremities with other complications G05.031    Anxiety F41.9    Essential hypertension I10    Leg edema, left R60.0    Acquired hypothyroidism E03.9    Hyperplastic colonic polyp 7/17 Dr Leonora Smith K63.5    Severe obesity (BMI 35.0-39. 9) with comorbidity ([address] E66.01     Past Surgical History:   Procedure Laterality Date    CARDIAC SURG PROCEDURE UNLIST  1/14    negative NST ef 65%    HX COLONOSCOPY      Dr Leonora Smith 7/3/12 negative; 6/21/17 polyp    HX CHADWICK AND BSO      NEUROLOGICAL PROCEDURE UNLISTED  3/15    ct head negative    VASCULAR SURGERY PROCEDURE UNLIST  2017    left leg saphenous vein closure Dr Reggie Shay     Current Outpatient Medications   Medication Sig Dispense Refill    amLODIPine (NORVASC) 10 mg tablet take 1 tablet by mouth once daily 90 Tab 1    carvedilol (COREG CR) 40 mg CR capsule take 1 capsule by mouth once daily 30 Cap 11    PREMARIN 0.625 mg tablet take 1 tablet by mouth once daily 90 Tab 3    lisinopril-hydroCHLOROthiazide (PRINZIDE, ZESTORETIC) 20-12.5 mg per tablet take 1 tablet by mouth every morning for blood pressure 90 Tab 1    Cholecalciferol, Vitamin D3, (VITAMIN D) 5,000 unit Tab Take  by mouth. No Known Allergies    Review of Systems    A full review of systems was completed times ten organ systems and was deemed negative unless otherwise mentioned in the HPI. Physical   Visit Vitals  /80 (BP 1 Location: Left arm, BP Patient Position: Sitting)   Resp 17   Ht 5' 3\" (1.6 m)   Wt 221 lb (100.2 kg)   BMI 39.15 kg/m²       Healthy appearing 60. She works full-time at [employer]. Head is normocephalic  Neck no JVD  Chest is clear  Abdomen soft flat nontender  Cardiac regular  Right lower extremity has disease 3-4 small healing punctate ulcers on the inner aspect of the ankle. Left lower extremity with no ulcers  Edema controlled with stockings  No signs of arterial insufficiency  Venous Doppler shows normal-appearing saphenous without reflux no DVT no deep reflux on the right      Impression/Plan:     ICD-10-CM ICD-9-CM    1. Venous ulcer of ankle, right ([facility]) I83.013 454.0     L97.319     Healing ulcer right lower extremity unsure of origin  We will continue with topical antibiotic and compression  Should these worsen would recommend a biopsy. No orders of the defined types were placed in this encounter. Nishi Eddy MD    PLEASE NOTE:  This document has been produced using voice recognition software. Unrecognized errors in transcription may be present.

## 2020-04-28 DIAGNOSIS — J40 BRONCHITIS: ICD-10-CM

## 2020-04-29 RX ORDER — ESTROGENS, CONJUGATED 0.62 MG/1
TABLET, FILM COATED ORAL
Qty: 90 TAB | Refills: 3 | Status: SHIPPED | OUTPATIENT
Start: 2020-04-29 | End: 2021-05-21

## 2020-07-06 DIAGNOSIS — Z00.00 PHYSICAL EXAM: ICD-10-CM

## 2020-12-18 ENCOUNTER — TELEPHONE (OUTPATIENT)
Dept: INTERNAL MEDICINE CLINIC | Age: 60
End: 2020-12-18

## 2020-12-18 NOTE — TELEPHONE ENCOUNTER
----- Message from Janet Rosenthal sent at 12/15/2020  3:07 PM EST -----  Regarding: ph call  Pt is requesting a ph call to have a cpe/lab appt scheduled [phone]

## 2020-12-31 ENCOUNTER — APPOINTMENT (OUTPATIENT)
Dept: INTERNAL MEDICINE CLINIC | Age: 60
End: 2020-12-31

## 2021-01-01 LAB
A-G RATIO,AGRAT: 1.4 RATIO (ref 1.1–2.6)
ABSOLUTE LYMPHOCYTE COUNT, 10803: 2.8 K/UL (ref 1–4.8)
ALBUMIN SERPL-MCNC: 4.1 G/DL (ref 3.5–5)
ALP SERPL-CCNC: 104 U/L (ref 40–120)
ALT SERPL-CCNC: 28 U/L (ref 5–40)
ANION GAP SERPL CALC-SCNC: 12.7 MMOL/L (ref 3–15)
AST SERPL W P-5'-P-CCNC: 26 U/L (ref 10–37)
BASOPHILS # BLD: 0 K/UL (ref 0–0.2)
BASOPHILS NFR BLD: 0 % (ref 0–2)
BILIRUB SERPL-MCNC: 0.4 MG/DL (ref 0.2–1.2)
BILIRUB UR QL: NEGATIVE
BUN SERPL-MCNC: 14 MG/DL (ref 6–22)
CALCIUM SERPL-MCNC: 8.8 MG/DL (ref 8.4–10.5)
CHLORIDE SERPL-SCNC: 101 MMOL/L (ref 98–110)
CHOLEST SERPL-MCNC: 151 MG/DL (ref 110–200)
CLARITY: CLEAR
CO2 SERPL-SCNC: 25 MMOL/L (ref 20–32)
COLOR UR: YELLOW
CREAT SERPL-MCNC: 0.8 MG/DL (ref 0.8–1.4)
EOSINOPHIL # BLD: 0.1 K/UL (ref 0–0.5)
EOSINOPHIL NFR BLD: 1 % (ref 0–6)
EPITHELIAL,EPSU: ABNORMAL /HPF (ref 0–2)
ERYTHROCYTE [DISTWIDTH] IN BLOOD BY AUTOMATED COUNT: 13.1 % (ref 10–15.5)
GFRAA, 66117: >60
GFRNA, 66118: >60
GLOBULIN,GLOB: 2.9 G/DL (ref 2–4)
GLUCOSE SERPL-MCNC: 98 MG/DL (ref 70–99)
GLUCOSE UR QL: NEGATIVE MG/DL
GRANULOCYTES,GRANS: 36 % (ref 40–75)
HCT VFR BLD AUTO: 40.2 % (ref 35.1–48)
HDLC SERPL-MCNC: 2.4 MG/DL (ref 0–5)
HDLC SERPL-MCNC: 64 MG/DL
HGB BLD-MCNC: 12.9 G/DL (ref 11.7–16)
HGB UR QL STRIP: NEGATIVE
KETONES UR QL STRIP.AUTO: NEGATIVE MG/DL
LDL/HDL RATIO,LDHD: 1.2
LDLC SERPL CALC-MCNC: 77 MG/DL (ref 50–99)
LEUKOCYTE ESTERASE: NEGATIVE
LYMPHOCYTES, LYMLT: 54 % (ref 20–45)
MCH RBC QN AUTO: 32 PG (ref 26–34)
MCHC RBC AUTO-ENTMCNC: 32 G/DL (ref 31–36)
MCV RBC AUTO: 98 FL (ref 81–99)
MONOCYTES # BLD: 0.4 K/UL (ref 0.1–1)
MONOCYTES NFR BLD: 9 % (ref 3–12)
NEUTROPHILS # BLD AUTO: 1.8 K/UL (ref 1.8–7.7)
NITRITE UR QL STRIP.AUTO: NEGATIVE
NON-HDL CHOLESTEROL, 011976: 87 MG/DL
PH UR STRIP: 7 PH (ref 5–8)
PLATELET # BLD AUTO: 202 K/UL (ref 140–440)
PMV BLD AUTO: 11.6 FL (ref 9–13)
POTASSIUM SERPL-SCNC: 3.9 MMOL/L (ref 3.5–5.5)
PROT SERPL-MCNC: 7 G/DL (ref 6.2–8.1)
PROT UR QL STRIP: NEGATIVE MG/DL
RBC # BLD AUTO: 4.1 M/UL (ref 3.8–5.2)
RBC #/AREA URNS HPF: ABNORMAL /HPF
SODIUM SERPL-SCNC: 139 MMOL/L (ref 133–145)
SP GR UR: 1.01 (ref 1–1.03)
TRIGL SERPL-MCNC: 49 MG/DL (ref 40–149)
URINE ASCORBIC ACID: ABNORMAL MG/DL
UROBILINOGEN UR STRIP-MCNC: <2 MG/DL
VLDLC SERPL CALC-MCNC: 10 MG/DL (ref 8–30)
WBC # BLD AUTO: 5.1 K/UL (ref 4–11)
WBC URNS QL MICRO: ABNORMAL /HPF (ref 0–2)
YEAST,YEAST: ABNORMAL

## 2021-01-03 NOTE — PROGRESS NOTES
61 y.o. BLACK OR  female who presents for RPE    No cardiovascular complaints. She stopped going to [address] with the pandemic. Remains active with her work at [employer] but not much exercise otherwise    The venous issues continue to plague her. She has not seen Dr Dat Lion of late and currently not using diuretic. She does wear stockings. No pnd, orthopnea    No gi or gu complaints. She planned to get the way overdue mammo back in [place] but then the pandemic hit and she never got around to calling them back    No success with attempts at wt loss and weight is actually going up!   Admits the diet is off    Vitals 1/8/2021 3/31/2020 1/7/2020 8/30/2019   Weight 232 lb 221 lb 221 lb 214 lb     Past Medical History:   Diagnosis Date    Allergic rhinitis     Anxiety     WADE-7 was 6/21    Chronic venous hypertension without complications 6/68/9930    Colon polyp 06/2017    Dr Essie Cohen Depression 1980s    zoloft    FHx: heart disease     Grave's disease 2003    and hyperthyroidism Dr. Hugo Bang Hypertension     neg w/u secondary causes 3/15 Dr Eunice Dakins Hypovitaminosis D     Morbid obesity ([facility] [address]     peak weight 225 lbs, bmi 39.1 from 2/15; has seen nutritionist in past    Subacromial bursitis     Venous reflux     12/14 EMILY Mark     Past Surgical History:   Procedure Laterality Date    CARDIAC SURG PROCEDURE UNLIST  1/14    negative NST ef 65%    HX COLONOSCOPY      Dr Lio Hogue 7/3/12 negative; 6/21/17 polyp    HX CHADWICK AND BSO      NEUROLOGICAL PROCEDURE UNLISTED  3/15    ct head negative    VASCULAR SURGERY PROCEDURE UNLIST  2017    left leg saphenous vein closure Dr Jimbo Kelly History     Socioeconomic History    Marital status:      Spouse name: Not on file    Number of children: 3    Years of education: Not on file    Highest education level: Not on file   Occupational History    Occupation: works at [address] [employer] resource strain: Not on file   Ronald[place]Soledad insecurity     Worry: Not on file     Inability: Not on file    Transportation needs     Medical: Not on file     Non-medical: Not on file   Tobacco Use    Smoking status: Never Smoker    Smokeless tobacco: Never Used   Substance and Sexual Activity    Alcohol use: No    Drug use: No    Sexual activity: Not Currently   Lifestyle    Physical activity     Days per week: Not on file     Minutes per session: Not on file    Stress: Not on file   Relationships    Social connections     Talks on phone: Not on file     Gets together: Not on file     Attends Confucianist service: Not on file     Active member of club or organization: Not on file     Attends meetings of clubs or organizations: Not on file     Relationship status: Not on file    Intimate partner violence     Fear of current or ex partner: Not on file     Emotionally abused: Not on file     Physically abused: Not on file     Forced sexual activity: Not on file   Other Topics Concern    Not on file   Social History Narrative    Not on file      Family History   Problem Relation Age of Onset    Heart Disease Father     Hypertension Sister     Heart Disease Mother     Seizures Sister     Sudden Death Neg Hx     Heart Attack Neg Hx        There is no immunization history on file for this patient. Current Outpatient Medications   Medication Sig    furosemide (LASIX) 20 mg tablet Once daily as needed for swelling    potassium chloride (KLOR-CON) 10 mEq tablet Once daily as needed with furosemide    amLODIPine (NORVASC) 10 mg tablet take 1 tablet by mouth once daily    Premarin 0.625 mg tablet take 1 tablet by mouth once daily    carvedilol (COREG CR) 40 mg CR capsule take 1 capsule by mouth once daily    Cholecalciferol, Vitamin D3, (VITAMIN D) 5,000 unit Tab Take  by mouth. No current facility-administered medications for this visit. No Known Allergies    REVIEW OF SYSTEMS: gyn 2015, mammo 2011?  [place] 6/17 Dr Orellana First - no vision change or eye pain  Oral - no mouth pain, tongue or tooth problems  Ears - no hearing loss, ear pain, fullness, no swallowing problems  Cardiac - no CP, PND, orthopnea, edema, palpitations or syncope  Chest - no breast masses  Resp - no wheezing, chronic coughing, dyspnea  GI - no heartburn, nausea, vomiting, change in bowel habits, bleeding, hemorrhoids  Urinary - no dysuria, hematuria, flank pain, urgency, frequency  Genitals - no genital lesions, discharge, masses, ulceration, warts    Visit Vitals  /79   Pulse 71   Temp 97.7 °F (36.5 °C) (Temporal)   Resp 16   Ht 5' 3\" (1.6 m)   Wt 232 lb (105.2 kg)   SpO2 96%   BMI 41.10 kg/m²   A&O x3  Affect is appropriate. Mood stable  No apparent distress  Anicteric, no JVD, adenopathy or thyromegaly. No carotid bruits or radiated murmur  Lungs clear to auscultation, no wheezes or rales  Heart showed regular rate and rhythm. No murmur, rubs, gallops  Abdomen soft nontender, no hepatosplenomegaly or masses. Declined rectal exam as she will be seeing the specialist  Extremities without edema. Pulses 1-2+ symmetrically.      LABS  From 11/07 showed gluc 92, cr 0.90,  alt 30, chol 164, tg 30, hdl 51, ldl-c 107, wbc 4.5, hb 12.7, plt 190, tsh 0.50  From 2/10 showed                    wbc 4.7, hb 13.7, plt 194  From 6/10 showed   gluc 88, cr 0.80,   alt 37, chol 168, tg 40, hdl 65, ldl-c 95  From 8/10 showed                                 tsh 0.43, vit d 27  From 3/12 showed                          ua neg  From 10/14 showed                          uric 6.8  From 3/15 showed   gluc 90, cr 0.80, gfr>60, alt 14, chol 174, tg 41, hdl 72, ldl-c 94,  wbc 4.4, hb 11.4, plt 218, tsh 0.27  From 3/15 showed                ck/trop neg  From 3/15 showed   gluc 99, cr 0.70, gfr>60, alt 12,           meta neg, luis/renin nl, serum catechols neg, urine catechols neg  From 12/15 showed gluc 91, cr 0.70, gfr>60, alt 18, chol 176, tg 45, hdl 72, ldl-c 96,  wbc 4.5, hb 12.9, plt 201, tsh 0.38, ua neg  From 12/18 showed gluc 89, cr 0.80, gfr>60, alt 16, chol 172, tg 35, hdl 69, ldl-c 96,  wbc 4.6, hb 13.4, plt 200, tsh 0.98, ft4 1.20  From 12/19 showed gluc 97, cr 0.90, gfr>60, alt 17, chol 165, tg 33, hdl 70, ldl-c 95,  wbc 4.7, hb 13.8, plt 201, tsh 1.24, ft4 1.20    Results for orders placed or performed in visit on 12/18/20   CBC WITH AUTOMATED DIFF   Result Value Ref Range    WBC 5.1 4.0 - 11.0 K/uL    RBC 4.10 3.80 - 5.20 M/uL    HGB 12.9 11.7 - 16.0 g/dL    HCT 40.2 35.1 - 48.0 %    MCV 98 81 - 99 fL    MCH 32 26 - 34 pg    MCHC 32 31 - 36 g/dL    RDW 13.1 10.0 - 15.5 %    PLATELET 136 829 - 966 K/uL    MPV 11.6 9.0 - 13.0 fL    NEUTROPHILS 36 (L) 40 - 75 %    Lymphocytes 54 (H) 20 - 45 %    MONOCYTES 9 3 - 12 %    EOSINOPHILS 1 0 - 6 %    BASOPHILS 0 0 - 2 %    ABS. NEUTROPHILS 1.8 1.8 - 7.7 K/uL    ABSOLUTE LYMPHOCYTE COUNT 2.8 1.0 - 4.8 K/uL    ABS. MONOCYTES 0.4 0.1 - 1.0 K/uL    ABS. EOSINOPHILS 0.1 0.0 - 0.5 K/uL    ABS. BASOPHILS 0.0 0.0 - 0.2 K/uL   URINALYSIS W/MICROSCOPIC   Result Value Ref Range    Color Yellow Straw, Lig    CLARITY Clear Clear, Sli    Specific Gravity 1.015 1.005 - 1.03    pH (UA) 7.0 5.0 - 8.0 pH    Protein Negative Negative, mg/dL    Glucose Negative Negative mg/dL    Ketone Negative Negative, mg/dL    Bilirubin Negative Negative    Blood Negative Negative    Nitrites Negative Negative    Leukocyte Esterase Negative Negative    Urobilinogen <2.0 <2.0 mg/dL    WBC 0-2 0 - 2 /hpf    RBC 0-2 Negative, /hpf    Yeast Few (A) (none)    Epithelial cells 0-2 0 - 2 /hpf    URINE ASCORBIC ACID 20* (A) Negative mg/dL   METABOLIC PANEL, COMPREHENSIVE   Result Value Ref Range    Glucose 98 70 - 99 mg/dL    BUN 14 6 - 22 mg/dL    Creatinine 0.8 0.8 - 1.4 mg/dL    Sodium 139 133 - 145 mmol/L    Potassium 3.9 3.5 - 5.5 mmol/L    Chloride 101 98 - 110 mmol/L    CO2 25 20 - 32 mmol/L    AST (SGOT) 26 10 - 37 U/L    ALT (SGPT) 28 5 - 40 U/L    Alk.  phosphatase 104 40 - 120 U/L    Bilirubin, total 0.4 0.2 - 1.2 mg/dL    Calcium 8.8 8.4 - 10.5 mg/dL    Protein, total 7.0 6.2 - 8.1 g/dL    Albumin 4.1 3.5 - 5.0 g/dL    A-G Ratio 1.4 1.1 - 2.6 ratio    Globulin 2.9 2.0 - 4.0 g/dL    Anion gap 12.7 3.0 - 15.0 mmol/L    GFRAA >60.0 >60.0    GFRNA >60.0 >60.0   LIPID PANEL   Result Value Ref Range    Triglyceride 49 40 - 149 mg/dL    HDL Cholesterol 64 >=40 mg/dL    Cholesterol, total 151 110 - 200 mg/dL    CHOLESTEROL/HDL 2.4 0.0 - 5.0    Non-HDL Cholesterol 87 <130 mg/dL    LDL, calculated 77 50 - 99 mg/dL    VLDL, calculated 10 8 - 30 mg/dL    LDL/HDL Ratio 1.2      We reviewed the patient's labs from the last several visits to point out trends in the numbers          Patient Active Problem List   Diagnosis Code    Varicose veins of lower extremities with other complications R01.622    Anxiety F41.9    Essential hypertension I10    Leg edema, left R60.0    Acquired hypothyroidism E03.9    Hyperplastic colonic polyp 7/17 Dr Benton Tejeda K63.5    Severe obesity (BMI 35.0-39. 9) with comorbidity ([facility] [address] E66.01     Assessment and plan:  1. HTN. Continue current  2. Thyroid. F/U Dr Kennedi Almanza   3. Vascular. Suggested she go back to see Dr Flaca Caballero; long discussion about adding lasix/kdur, sfx discussed. Call  w update  4. Obesity. Very winter discussion. Lifestyle and dietary measures. Portion control reiterated. Low carb approach, even briefly mentioned fasting. She'll work on it  5. Mammogram ordered and we again discussed importance of routine screening, she promises to get done  6. Declined any immunizations        RTC 1/21    Above conditions discussed at length and patient vocalized understanding.   All questions answered to patient satisfaction

## 2021-01-08 ENCOUNTER — OFFICE VISIT (OUTPATIENT)
Dept: INTERNAL MEDICINE CLINIC | Age: 61
End: 2021-01-08
Payer: COMMERCIAL

## 2021-01-08 VITALS
OXYGEN SATURATION: 96 % | RESPIRATION RATE: 16 BRPM | BODY MASS INDEX: 41.11 KG/M2 | DIASTOLIC BLOOD PRESSURE: 79 MMHG | WEIGHT: 232 LBS | TEMPERATURE: 97.7 F | SYSTOLIC BLOOD PRESSURE: 115 MMHG | HEART RATE: 71 BPM | HEIGHT: 63 IN

## 2021-01-08 DIAGNOSIS — Z12.31 SCREENING MAMMOGRAM FOR HIGH-RISK PATIENT: ICD-10-CM

## 2021-01-08 DIAGNOSIS — E66.01 SEVERE OBESITY (BMI 35.0-39.9) WITH COMORBIDITY (HCC): ICD-10-CM

## 2021-01-08 DIAGNOSIS — K63.5 HYPERPLASTIC COLONIC POLYP, UNSPECIFIED PART OF COLON: ICD-10-CM

## 2021-01-08 DIAGNOSIS — E03.9 ACQUIRED HYPOTHYROIDISM: ICD-10-CM

## 2021-01-08 DIAGNOSIS — Z00.00 PHYSICAL EXAM: ICD-10-CM

## 2021-01-08 DIAGNOSIS — I87.9 VENOUS DISEASE: ICD-10-CM

## 2021-01-08 DIAGNOSIS — I10 ESSENTIAL HYPERTENSION: ICD-10-CM

## 2021-01-08 DIAGNOSIS — Z00.00 PHYSICAL EXAM: Primary | ICD-10-CM

## 2021-01-08 DIAGNOSIS — R60.9 EDEMA, UNSPECIFIED TYPE: ICD-10-CM

## 2021-01-08 PROCEDURE — 99396 PREV VISIT EST AGE 40-64: CPT | Performed by: INTERNAL MEDICINE

## 2021-01-08 RX ORDER — FUROSEMIDE 20 MG/1
TABLET ORAL
Qty: 90 TAB | Refills: 3 | Status: SHIPPED | OUTPATIENT
Start: 2021-01-08 | End: 2022-07-06

## 2021-01-08 RX ORDER — POTASSIUM CHLORIDE 750 MG/1
TABLET, EXTENDED RELEASE ORAL
Qty: 90 TAB | Refills: 3 | Status: SHIPPED | OUTPATIENT
Start: 2021-01-08 | End: 2022-07-06

## 2021-01-08 NOTE — PROGRESS NOTES
Chava Harvey presents today for   Chief Complaint   Patient presents with    Complete Physical    Labs    Hypertension     . 1. Have you been to the ER, urgent care clinic since your last visit? Hospitalized since your last visit? No    2. Have you seen or consulted any other health care providers outside of the [address] [place] [address] since your last visit? Include any pap smears or colon screening.  No

## 2021-05-20 DIAGNOSIS — J40 BRONCHITIS: ICD-10-CM

## 2021-05-21 RX ORDER — ESTROGENS, CONJUGATED 0.62 MG/1
TABLET, FILM COATED ORAL
Qty: 90 TABLET | Refills: 3 | Status: SHIPPED | OUTPATIENT
Start: 2021-05-21 | End: 2022-05-30

## 2021-12-19 RX ORDER — AMLODIPINE BESYLATE 10 MG/1
TABLET ORAL
Qty: 90 TABLET | Refills: 3 | Status: SHIPPED | OUTPATIENT
Start: 2021-12-19

## 2021-12-21 ENCOUNTER — TELEPHONE (OUTPATIENT)
Dept: MAMMOGRAPHY | Age: 61
End: 2021-12-21

## 2021-12-21 NOTE — TELEPHONE ENCOUNTER
I called  Timo Talamantes  To assist  this her  in scheduling a Mammogram . I left a message for this patient to return my call  at [phone].     Leta Eid LPN   Panel Manager

## 2021-12-26 LAB — SARS-COV-2, NAA: POSITIVE

## 2021-12-27 ENCOUNTER — TELEPHONE (OUTPATIENT)
Dept: INTERNAL MEDICINE CLINIC | Age: 61
End: 2021-12-27

## 2021-12-28 ENCOUNTER — VIRTUAL VISIT (OUTPATIENT)
Dept: INTERNAL MEDICINE CLINIC | Age: 61
End: 2021-12-28
Payer: COMMERCIAL

## 2021-12-28 ENCOUNTER — TELEPHONE (OUTPATIENT)
Dept: INTERNAL MEDICINE CLINIC | Age: 61
End: 2021-12-28

## 2021-12-28 DIAGNOSIS — R05.9 COUGH: Primary | ICD-10-CM

## 2021-12-28 DIAGNOSIS — U07.1 COVID-19 VIRUS INFECTION: ICD-10-CM

## 2021-12-28 PROCEDURE — 99214 OFFICE O/P EST MOD 30 MIN: CPT | Performed by: INTERNAL MEDICINE

## 2021-12-28 RX ORDER — HYDROCODONE POLISTIREX AND CHLORPHENIRAMINE POLISTIREX 10; 8 MG/5ML; MG/5ML
5 SUSPENSION, EXTENDED RELEASE ORAL
Qty: 100 ML | Refills: 0 | Status: SHIPPED | OUTPATIENT
Start: 2021-12-28 | End: 2022-01-07

## 2021-12-28 NOTE — PROGRESS NOTES
Katie Solomon is a 64 y.o. female who was seen by synchronous (real-time) audio-video technology on 12/28/2021 for Positive For Covid-19        Assessment & Plan:   Diagnoses and all orders for this visit:    1. Cough  -     HYDROcodone-chlorpheniramine (TUSSIONEX) 10-8 mg/5 mL suspension; Take 5 mL by mouth every twelve (12) hours as needed for Cough for up to 10 days. Max Daily Amount: 10 mL. 2. COVID-19 virus infection  -     HYDROcodone-chlorpheniramine (TUSSIONEX) 10-8 mg/5 mL suspension; Take 5 mL by mouth every twelve (12) hours as needed for Cough for up to 10 days. Max Daily Amount: 10 mL. I spent at least 15 minutes on this visit with this established patient. 712  Subjective:     Objective:   No flowsheet data found. General: alert, cooperative, no distress   Mental  status: normal mood, behavior, speech, dress, motor activity, and thought processes, able to follow commands   HENT: NCAT   Neck: no visualized mass   Resp: no respiratory distress   Neuro: no gross deficits   Skin: no discoloration or lesions of concern on visible areas   Psychiatric: normal affect, consistent with stated mood, no evidence of hallucinations     Additional exam findings: We discussed the expected course, resolution and complications of the diagnosis(es) in detail. Medication risks, benefits, costs, interactions, and alternatives were discussed as indicated. I advised her to contact the office if her condition worsens, changes or fails to improve as anticipated. She expressed understanding with the diagnosis(es) and plan. Katie Solomon, was evaluated through a synchronous (real-time) audio-video encounter. The patient (or guardian if applicable) is aware that this is a billable service. Verbal consent to proceed has been obtained within the past 12 months.  The visit was conducted pursuant to the emergency declaration under the [facility], [facility] waiver authority and the [employer] General Act. Patient identification was verified, and a caregiver was present when appropriate. The patient was located in a state where the provider was credentialed to provide care. Kinga Matias MD    Patient i received Pfizer vaccine in April but has not received her boosters as yet. About 5 days ago, she noted onset of generalized aches, chest congestion, mostly clear cough, generally feeling unwell. She went to urgent care 2 days ago and had testing which came back positive for Covid. She is asking specifically for a cough syrup. Denies any wheezing, dyspnea, she does not have a pulse ox machine at this time. Been using OTC meds with mild/minimal relief of symptoms    Past Medical History:   Diagnosis Date    Allergic rhinitis     Anxiety     WADE-7 was 6/21    Chronic venous hypertension without complications 4/28/6749    Colon polyp 06/2017    Dr Mimi Rosario Depression 1980s    zoloft    FHx: heart disease     Grave's disease 2003    and hyperthyroidism Dr. Henrry Blankenship Hypertension     neg w/u secondary causes 3/15 Dr Kieran Thayer Hypovitaminosis D     Morbid obesity ([facility] [address]     peak weight 225 lbs, bmi 39.1 from 2/15; has seen nutritionist in past    Subacromial bursitis     Venous reflux     12/14 EMILY Lopez     Current Outpatient Medications   Medication Sig    HYDROcodone-chlorpheniramine (TUSSIONEX) 10-8 mg/5 mL suspension Take 5 mL by mouth every twelve (12) hours as needed for Cough for up to 10 days. Max Daily Amount: 10 mL.     amLODIPine (NORVASC) 10 mg tablet take 1 tablet by mouth once daily    Premarin 0.625 mg tablet take 1 tablet by mouth once daily    carvedilol (COREG CR) 40 mg CR capsule take 1 capsule by mouth once daily    furosemide (LASIX) 20 mg tablet Once daily as needed for swelling    potassium chloride (KLOR-CON) 10 mEq tablet Once daily as needed with furosemide    Cholecalciferol, Vitamin D3, (VITAMIN D) 5,000 unit Tab Take  by mouth. No current facility-administered medications for this visit. No Known Allergies    Assessment and plan:  1. Covid infection, day #5 or 6 of symptoms. We discussed availability of the recently reviewed pills for Covid infection, not available at this time. I spent time discussing with her the antibody infusion available at the ER. Went over risks and side effects. She agreed to get this done so we will call the ER for availability. I called in Tussionex for the cough per her request.        Instructions above were handwritten on the lab results sheet that I gave the patient today    Above conditions discussed at length and patient vocalized understanding. All questions answered to patient satisfaction      Addendum:  Roughly 10 minutes after our discussion, the patient called back saying she did not want to take the Covid antibody treatment and just wanted the cough syrup called in.   She will call if she changes her mind, aware of the 10d window as well

## 2021-12-28 NOTE — TELEPHONE ENCOUNTER
----- Message from Porfirio Drea sent at 12/28/2021  1:39 PM EST -----  Subject: Message to Provider    QUESTIONS  Information for Provider? Pt is wanting to just get the cough medicine &   does not want to get the infusion completed. ---------------------------------------------------------------------------  --------------  Long Muse INFO  What is the best way for the office to contact you? OK to leave message on   voicemail  Preferred Call Back Phone Number? [phone]  ---------------------------------------------------------------------------  --------------  SCRIPT ANSWERS  Relationship to Patient?  Self

## 2021-12-28 NOTE — PROGRESS NOTES
Jasper Shi presents with No chief complaint on file. 1. \"Have you been to the ER, urgent care clinic since your last visit? Hospitalized since your last visit? \" no    2. \"Have you seen or consulted any other health care providers outside of the [address] [place] [address] since your last visit? \" yes    3. For patients aged 39-70: Has the patient had a colonoscopy? No     If the patient is female:    4. For patients aged 41-77: Has the patient had a mammogram within the past 2 years? No    5. For patients aged 21-65: Has the patient had a pap smear?  No

## 2021-12-29 ENCOUNTER — TELEPHONE (OUTPATIENT)
Dept: INTERNAL MEDICINE CLINIC | Age: 61
End: 2021-12-29

## 2021-12-29 ENCOUNTER — VIRTUAL VISIT (OUTPATIENT)
Dept: INTERNAL MEDICINE CLINIC | Age: 61
End: 2021-12-29

## 2021-12-29 DIAGNOSIS — U07.1 COVID-19: Primary | ICD-10-CM

## 2021-12-29 NOTE — TELEPHONE ENCOUNTER
Patient stated she started having symptoms Saturday 12-25-21 and tested positive 12-26-21, she stated that her blood pressure was running 149/97 in the am before meds, after meds it was 117/81 and most recently has been back up to 147/109. Patient stated that she is now interested in the antibody therapy. She was instructed to obtain a pulse oximeter to monitor her oxygen levels and to go to ER for evaluation if they dropped below 91% consistently, and that this info would be passed on to MD and on call, but should go to urgent care or ER to be evaluated if condition worsens. Patient verbalized understanding.

## 2021-12-29 NOTE — PROGRESS NOTES
She is a 60-year-old female with history of hypertension, bilateral mild renal artery stenosis, colon polyps, anxiety/depression, obesity, vitamin D deficiency,    The patient was recently seen virtually after being diagnosed with COVID-19 at [facility].  Since then, her SBP's are as high as the 160s. Her lowest SBP was 111. Her PCP recommended that she try Tussionex for symptomatic relief of her cough. The patient is interested in getting the antibiotic treatment, given her positive Covid status. She denies fever. Her temperature today is 98.3. She is taking Tylenol as needed for symptomatic relief. She has body aches, fatigue, and a cough. No shortness of breath. She has never smoked or vapes. Her BMI is 41. Her past medical history includes hypertension, colon polyps, anxiety/depression, and obesity. PLAN:  We discussed how she would have to go to the emergency department in order to get the IV antibiotic treatment. We discussed how it would be up to the ED team if she actually gets this treatment. I encouraged her to go to the emergency department if her oxygen saturation is 92% or lower. She is to get a pulse oximeter at her local pharmacy via the drive-through surfaces. She will call in advance and have them set one aside for her to purchase through [address] [facility]. I encouraged her to continue taking medication as prescribed for symptomatic relief over-the-counter. I encouraged her to  the prescription that  (cough rx) ordered for her, for symptomatic relief. Rest.  Hydration with water. I encouraged her to quarantine as recommended by the CDC.     Dr. Flores Plasencia  [facility] [address], [facility].  Phone: [phone]  Fax: [phone]

## 2022-01-17 ENCOUNTER — TELEPHONE (OUTPATIENT)
Dept: INTERNAL MEDICINE CLINIC | Age: 62
End: 2022-01-17

## 2022-01-17 DIAGNOSIS — U07.1 COVID-19 VIRUS INFECTION: ICD-10-CM

## 2022-01-17 DIAGNOSIS — R05.9 COUGH: ICD-10-CM

## 2022-01-17 DIAGNOSIS — R05.9 COUGH: Primary | ICD-10-CM

## 2022-01-17 RX ORDER — AZITHROMYCIN 250 MG/1
TABLET, FILM COATED ORAL
Qty: 6 TABLET | Refills: 0 | Status: SHIPPED | OUTPATIENT
Start: 2022-01-17 | End: 2022-07-05 | Stop reason: ALTCHOICE

## 2022-01-17 RX ORDER — HYDROCODONE POLISTIREX AND CHLORPHENIRAMINE POLISTIREX 10; 8 MG/5ML; MG/5ML
5 SUSPENSION, EXTENDED RELEASE ORAL
Qty: 100 ML | Refills: 0 | Status: SHIPPED | OUTPATIENT
Start: 2022-01-17 | End: 2022-01-27

## 2022-01-17 RX ORDER — ALBUTEROL SULFATE 90 UG/1
2 AEROSOL, METERED RESPIRATORY (INHALATION)
Qty: 1 EACH | Refills: 1 | Status: SHIPPED | OUTPATIENT
Start: 2022-01-17

## 2022-01-17 NOTE — TELEPHONE ENCOUNTER
Any sob? Wheezing? What's the pulse ox running? Is she coughing up anything?   Will need cxr - ordered  tussionex refilled

## 2022-01-17 NOTE — TELEPHONE ENCOUNTER
Patient was given cough medicine last month. Stating she is still coughing. She was coughing the whole time she was on the phone with me. Wants to know if some more can be sent in to [facility].

## 2022-01-17 NOTE — TELEPHONE ENCOUNTER
Patient reached and given information; Patient states that she is having some wheezing and coughing up beige colored sputum, pulse ox running around 98, stated her BP running high sbp in 150s and dbp around  for the past two days

## 2022-01-19 RX ORDER — CARVEDILOL PHOSPHATE 40 MG/1
CAPSULE, EXTENDED RELEASE ORAL
Qty: 180 CAPSULE | Refills: 3 | Status: SHIPPED | OUTPATIENT
Start: 2022-01-19 | End: 2022-02-01 | Stop reason: CLARIF

## 2022-01-22 DIAGNOSIS — I10 ESSENTIAL HYPERTENSION: ICD-10-CM

## 2022-01-22 DIAGNOSIS — E03.9 ACQUIRED HYPOTHYROIDISM: ICD-10-CM

## 2022-01-31 ENCOUNTER — TELEPHONE (OUTPATIENT)
Dept: INTERNAL MEDICINE CLINIC | Age: 62
End: 2022-01-31

## 2022-01-31 DIAGNOSIS — I10 ESSENTIAL HYPERTENSION: Primary | ICD-10-CM

## 2022-01-31 NOTE — TELEPHONE ENCOUNTER
LMTRC to find out if patient had tried anything prior to the carvedilol, the prior authorization is asking this, and will likely be rejected if the answer is no, and I can't find any records of other medications tried prior to this

## 2022-01-31 NOTE — TELEPHONE ENCOUNTER
Patient called and states that her insurance is rejecting the following medication:     - carvedilol (COREG CR) 40 mg CR capsule    She is unsure exactly why they are rejecting it but her pharmacy [facility] [address]) instructed her to call Dr. Kiet Early. She is asking if there is something else that can be prescribed that is close to or an equivalent of the medication. She is now out of the carvedilol. Patient can be reached at [phone].     Please advise, thank you

## 2022-02-01 RX ORDER — CARVEDILOL 25 MG/1
25 TABLET ORAL 2 TIMES DAILY WITH MEALS
Qty: 180 TABLET | Refills: 3 | Status: SHIPPED | OUTPATIENT
Start: 2022-02-01

## 2022-03-19 PROBLEM — E66.01 SEVERE OBESITY (BMI 35.0-39.9) WITH COMORBIDITY (HCC): Status: ACTIVE | Noted: 2018-05-17

## 2022-03-19 PROBLEM — K63.5 HYPERPLASTIC COLONIC POLYP: Status: ACTIVE | Noted: 2017-07-13

## 2022-05-30 DIAGNOSIS — J40 BRONCHITIS: ICD-10-CM

## 2022-05-30 RX ORDER — ESTROGENS, CONJUGATED 0.62 MG/1
TABLET, FILM COATED ORAL
Qty: 90 TABLET | Refills: 3 | Status: SHIPPED | OUTPATIENT
Start: 2022-05-30

## 2022-07-01 NOTE — PROGRESS NOTES
58 y.o. BLACK/ female who presents for RPE    No cardiovascular complaints. She stopped going to [facility] with the pandemic but is trying to get sn exercise program started on her own with variable success. Her bp has been controlled when she checks    She has not seen Dr Milly Cross lately and not using the lasix at this point. She does wear stockings. No pnd, orthopnea    No gi or gu complaints. Still has not had her mammo, reminded her that she's on E2 replacement which inc the risk.   Requesting referral to GI for colo       No success with attempts at wt loss     Vitals 7/5/2022 1/8/2021 3/31/2020 1/7/2020 8/30/2019   Weight 232 lb 232 lb 221 lb 221 lb 214 lb     Past Medical History:   Diagnosis Date    Allergic rhinitis     Anxiety     WADE-7 was 6/21    Chronic venous hypertension without complications 8/59/0426    Colon polyp 06/2017    Dr Brittany Agee    COVID-19 virus infection 04/2022    COVID-19 virus infection 12/2021    Depression 1980s    zoloft    FHx: heart disease     Grave's disease 2003    and hyperthyroidism Dr. Ralph Cole Hypertension     neg w/u secondary causes 3/15 Dr Judy Sethi Hypovitaminosis D     Morbid obesity ([facility] [address]     peak weight 225 lbs, bmi 39.1 from 2/15; has seen nutritionist in past    Subacromial bursitis     Venous reflux     12/14 EMILY Burt     Past Surgical History:   Procedure Laterality Date    HX CATARACT REMOVAL Bilateral 2021    HX COLONOSCOPY      Dr Brittany Agee 7/3/12 negative; 6/21/17 polyp    HX CHADWICK AND BSO      NEUROLOGICAL PROCEDURE UNLISTED  3/15    ct head negative    [place] CARDIAC SURG PROCEDURE UNLIST  1/14    negative NST ef 65%    VASCULAR SURGERY PROCEDURE UNLIST  2017    left leg saphenous vein closure Dr Gerhardt Rake History     Socioeconomic History    Marital status:      Spouse name: Not on file    Number of children: 3    Years of education: Not on file    Highest education level: Not on file   Occupational History    Occupation: works at [employer] Smoking status: Never Smoker    Smokeless tobacco: Never Used   Substance and Sexual Activity    Alcohol use: No    Drug use: No    Sexual activity: Not Currently   Other Topics Concern    Not on file   Social History Narrative    Not on file     Social Determinants of Health     Financial Resource Strain:     Difficulty of Paying Living Expenses: Not on file   Food Insecurity:     Worried About Running Out of Food in the Last Year: Not on file    Terence of Food in the Last Year: Not on file   Transportation Needs:     Lack of Transportation (Medical): Not on file    Lack of Transportation (Non-Medical):  Not on file   Physical Activity:     Days of Exercise per Week: Not on file    Minutes of Exercise per Session: Not on file   Stress:     Feeling of Stress : Not on file   Social Connections:     Frequency of Communication with Friends and Family: Not on file    Frequency of Social Gatherings with Friends and Family: Not on file    Attends Rastafarian Services: Not on file    Active Member of [address] [place] [address] or Organizations: Not on file    Attends Club or Organization Meetings: Not on file    Marital Status: Not on file   Intimate Partner Violence:     Fear of Current or Ex-Partner: Not on file    Emotionally Abused: Not on file    Physically Abused: Not on file    Sexually Abused: Not on file   Housing Stability:     Unable to Pay for Housing in the Last Year: Not on file    Number of [place] in the Last Year: Not on file    Unstable Housing in the Last Year: Not on file      Family History   Problem Relation Age of Onset    Heart Disease Father     Hypertension Sister     Heart Disease Mother     Seizures Sister     Sudden Death Neg Hx     Heart Attack Neg Hx      Immunization History   Administered Date(s) Administered    COVID-19, PFIZER PURPLE top, DILUTE for use, (age 15 y+), IM, 30mcg/0.3mL 04/01/2021, 04/29/2021, 01/16/2022 Current Outpatient Medications   Medication Sig    Premarin 0.625 mg tablet take 1 tablet by mouth once daily    carvediloL (COREG) 25 mg tablet Take 1 Tablet by mouth two (2) times daily (with meals).  albuterol (PROVENTIL HFA, VENTOLIN HFA, PROAIR HFA) 90 mcg/actuation inhaler Take 2 Puffs by inhalation every six (6) hours as needed for Wheezing.  amLODIPine (NORVASC) 10 mg tablet take 1 tablet by mouth once daily    Cholecalciferol, Vitamin D3, (VITAMIN D) 5,000 unit Tab Take  by mouth. No current facility-administered medications for this visit. No Known Allergies    REVIEW OF SYSTEMS: gyn 2015, mammo 2011? [place] 6/17 Dr Benton Tejeda  Ophtho - no vision change or eye pain  Oral - no mouth pain, tongue or tooth problems  Ears - no hearing loss, ear pain, fullness, no swallowing problems  Cardiac - no CP, PND, orthopnea, edema, palpitations or syncope  Chest - no breast masses  Resp - no wheezing, chronic coughing, dyspnea  GI - no heartburn, nausea, vomiting, change in bowel habits, bleeding, hemorrhoids  Urinary - no dysuria, hematuria, flank pain, urgency, frequency  Genitals - no genital lesions, discharge, masses, ulceration, warts    Visit Vitals  /75   Pulse 74   Temp 97.6 °F (36.4 °C) (Temporal)   Resp 16   Ht 5' 3\" (1.6 m)   Wt 232 lb (105.2 kg)   SpO2 97%   BMI 41.10 kg/m²   A&O x3  Affect is appropriate. Mood stable  No apparent distress  Anicteric, no JVD, adenopathy or thyromegaly. No carotid bruits or radiated murmur  Lungs clear to auscultation, no wheezes or rales  Heart showed regular rate and rhythm. No murmur, rubs, gallops  Abdomen soft nontender, no hepatosplenomegaly or masses. Extremities with 1+ ankle edema. Pulses 1-2+ symmetrically.      LABS  From 11/07 showed gluc 92, cr 0.90,  alt 30, chol 164, tg 30, hdl 51, ldl-c 107, wbc 4.5, hb 12.7, plt 190, tsh 0.50  From 2/10 showed                    wbc 4.7, hb 13.7, plt 194  From 6/10 showed   gluc 88, cr 0.80, alt 37, chol 168, tg 40, hdl 65, ldl-c 95  From 8/10 showed                                 tsh 0.43, vit d 27  From 3/12 showed                          ua neg  From 10/14 showed                          uric 6.8  From 3/15 showed   gluc 90, cr 0.80, gfr>60, alt 14, chol 174, tg 41, hdl 72, ldl-c 94,  wbc 4.4, hb 11.4, plt 218, tsh 0.27  From 3/15 showed                ck/trop-  From 3/15 showed   gluc 99, cr 0.70, gfr>60, alt 12,           meta neg, luis/renin nl, serum catechols neg, urine catechols neg  From 12/15 showed gluc 91, cr 0.70, gfr>60, alt 18, chol 176, tg 45, hdl 72, ldl-c 96,  wbc 4.5, hb 12.9, plt 201, tsh 0.38, ua neg  From 12/18 showed gluc 89, cr 0.80, gfr>60, alt 16, chol 172, tg 35, hdl 69, ldl-c 96,  wbc 4.6, hb 13.4, plt 200, tsh 0.98, ft4 1.20  From 12/19 showed gluc 97, cr 0.90, gfr>60, alt 17, chol 165, tg 33, hdl 70, ldl-c 95,  wbc 4.7, hb 13.8, plt 201, tsh 1.24, ft4 1.20  From 1/21 showed   gluc 98, cr 0.80  gfr>60, alt 28, chol 151, tg 49, hdl 64, ldl-c 77,  wbc 5.1, hb 12.9, plt 202,    ua neg      We reviewed the patient's labs from the last several visits to point out trends in the numbers          Patient Active Problem List   Diagnosis Code    Asymptomatic varicose veins of both lower extremities I83.93    Essential hypertension I10    Leg edema, left R60.0    Acquired hypothyroidism E03.9    Hyperplastic colonic polyp 7/17 Dr Lenore Elizabeth K63.5    Severe obesity (BMI 35.0-39. 9) with comorbidity ([facility] [address] E66.01     Assessment and plan:  1.  HTN. Controlled on current  2. Thyroid. F/U Dr Malcolm Bernal   3. Vascular. Off diuretic. Follow up Dr Finn burrows  4. Obesity. Very winter discussion. Lifestyle and dietary measures. Portion control reiterated. 5.  Mammogram ordered and we again discussed importance of routine screening, she promises to get done. Screening colo referral sent to [facility]  6.   Declined any immunizations        RTC 7/23    Above conditions discussed at length and patient vocalized understanding. All questions answered to patient satisfaction        ICD-10-CM ICD-9-CM    1. Encounter for annual physical exam  Z00.00 V70.0 CANCELED: METABOLIC PANEL, COMPREHENSIVE      CANCELED: CBC W/O DIFF      CANCELED: CULTURE, URINE      CANCELED: AMB POC URINALYSIS DIP STICK AUTO W/ MICRO      CANCELED: TSH AND FREE T4   2. Acquired hypothyroidism  E03.9 244.9 TSH 3RD GENERATION      T4, FREE      CANCELED: METABOLIC PANEL, COMPREHENSIVE      CANCELED: CBC W/O DIFF      CANCELED: CULTURE, URINE      CANCELED: AMB POC URINALYSIS DIP STICK AUTO W/ MICRO      CANCELED: TSH AND FREE T4   3. Essential hypertension  I10 401.9    4. Hyperplastic colonic polyp, unspecified part of colon  K63.5 211.3 REFERRAL TO GASTROENTEROLOGY   5. Asymptomatic varicose veins of both lower extremities  I83.93 454.9    6. Screening mammogram for breast cancer  Z12.31 V76.12 [facility] MAMMO BI SCREENING INCL CAD   7.  Physical exam  Z00.00 V70.9 CBC W/O DIFF      METABOLIC PANEL, COMPREHENSIVE      LIPID PANEL      TSH 3RD GENERATION      T4, FREE      HEMOGLOBIN A1C WITH EAG

## 2022-07-05 ENCOUNTER — OFFICE VISIT (OUTPATIENT)
Dept: INTERNAL MEDICINE CLINIC | Age: 62
End: 2022-07-05
Payer: COMMERCIAL

## 2022-07-05 VITALS
HEART RATE: 74 BPM | WEIGHT: 232 LBS | HEIGHT: 63 IN | SYSTOLIC BLOOD PRESSURE: 121 MMHG | BODY MASS INDEX: 41.11 KG/M2 | TEMPERATURE: 97.6 F | DIASTOLIC BLOOD PRESSURE: 75 MMHG | OXYGEN SATURATION: 97 % | RESPIRATION RATE: 16 BRPM

## 2022-07-05 DIAGNOSIS — Z12.31 SCREENING MAMMOGRAM FOR BREAST CANCER: ICD-10-CM

## 2022-07-05 DIAGNOSIS — I83.93 ASYMPTOMATIC VARICOSE VEINS OF BOTH LOWER EXTREMITIES: ICD-10-CM

## 2022-07-05 DIAGNOSIS — Z00.00 ENCOUNTER FOR ANNUAL PHYSICAL EXAM: Primary | ICD-10-CM

## 2022-07-05 DIAGNOSIS — K63.5 HYPERPLASTIC COLONIC POLYP, UNSPECIFIED PART OF COLON: ICD-10-CM

## 2022-07-05 DIAGNOSIS — E03.9 ACQUIRED HYPOTHYROIDISM: ICD-10-CM

## 2022-07-05 DIAGNOSIS — Z00.00 PHYSICAL EXAM: ICD-10-CM

## 2022-07-05 DIAGNOSIS — I10 ESSENTIAL HYPERTENSION: ICD-10-CM

## 2022-07-05 PROCEDURE — 99396 PREV VISIT EST AGE 40-64: CPT | Performed by: INTERNAL MEDICINE

## 2022-07-05 NOTE — PROGRESS NOTES
Zeenat Urrutia presents with   Chief Complaint   Patient presents with    Physical    Hypertension            1. \"Have you been to the ER, urgent care clinic since your last visit? Hospitalized since your last visit? \" No    2. \"Have you seen or consulted any other health care providers outside of the [address] [place] [address] since your last visit? \" No     3. For patients aged 39-70: Has the patient had a colonoscopy / FIT/ Cologuard? Yes - no Care Gap present      If the patient is female:    4. For patients aged 41-77: Has the patient had a mammogram within the past 2 years? Yes - no Care Gap present      5. For patients aged 21-65: Has the patient had a pap smear?  No

## 2022-07-06 DIAGNOSIS — Z00.00 PHYSICAL EXAM: ICD-10-CM

## 2022-07-06 DIAGNOSIS — Z12.31 SCREENING MAMMOGRAM FOR BREAST CANCER: ICD-10-CM

## 2022-07-11 ENCOUNTER — LAB ONLY (OUTPATIENT)
Dept: INTERNAL MEDICINE CLINIC | Age: 62
End: 2022-07-11

## 2022-07-11 DIAGNOSIS — I10 ESSENTIAL HYPERTENSION: ICD-10-CM

## 2022-07-11 DIAGNOSIS — Z00.00 ENCOUNTER FOR ANNUAL PHYSICAL EXAM: ICD-10-CM

## 2022-07-11 DIAGNOSIS — Z00.00 ENCOUNTER FOR ANNUAL PHYSICAL EXAM: Primary | ICD-10-CM

## 2022-07-12 LAB
A-G RATIO,AGRAT: 1.5 RATIO (ref 1.1–2.6)
ALBUMIN SERPL-MCNC: 4.1 G/DL (ref 3.5–5)
ALP SERPL-CCNC: 100 U/L (ref 40–120)
ALT SERPL-CCNC: 22 U/L (ref 5–40)
ANION GAP SERPL CALC-SCNC: 13 MMOL/L (ref 3–15)
AST SERPL W P-5'-P-CCNC: 21 U/L (ref 10–37)
AVG GLU, 10930: 127 MG/DL (ref 91–123)
BILIRUB SERPL-MCNC: 0.3 MG/DL (ref 0.2–1.2)
BUN SERPL-MCNC: 13 MG/DL (ref 6–22)
CALCIUM SERPL-MCNC: 8.7 MG/DL (ref 8.4–10.5)
CHLORIDE SERPL-SCNC: 102 MMOL/L (ref 98–110)
CHOLEST SERPL-MCNC: 155 MG/DL (ref 110–200)
CO2 SERPL-SCNC: 25 MMOL/L (ref 20–32)
CREAT SERPL-MCNC: 0.7 MG/DL (ref 0.8–1.4)
ERYTHROCYTE [DISTWIDTH] IN BLOOD BY AUTOMATED COUNT: 13.2 % (ref 10–15.5)
GLOBULIN,GLOB: 2.7 G/DL (ref 2–4)
GLOMERULAR FILTRATION RATE: >60 ML/MIN/1.73 SQ.M.
GLUCOSE SERPL-MCNC: 104 MG/DL (ref 70–99)
HBA1C MFR BLD HPLC: 6 % (ref 4.8–5.6)
HCT VFR BLD AUTO: 40 % (ref 35.1–48)
HDLC SERPL-MCNC: 2.3 MG/DL (ref 0–5)
HDLC SERPL-MCNC: 67 MG/DL
HGB BLD-MCNC: 12.8 G/DL (ref 11.7–16)
LDL/HDL RATIO,LDHD: 1.1
LDLC SERPL CALC-MCNC: 77 MG/DL (ref 50–99)
MCH RBC QN AUTO: 32 PG (ref 26–34)
MCHC RBC AUTO-ENTMCNC: 32 G/DL (ref 31–36)
MCV RBC AUTO: 99 FL (ref 80–99)
NON-HDL CHOLESTEROL, 011976: 88 MG/DL
PLATELET # BLD AUTO: 188 K/UL (ref 140–440)
PMV BLD AUTO: 11.6 FL (ref 9–13)
POTASSIUM SERPL-SCNC: 4.2 MMOL/L (ref 3.5–5.5)
PROT SERPL-MCNC: 6.8 G/DL (ref 6.2–8.1)
RBC # BLD AUTO: 4.04 M/UL (ref 3.8–5.2)
SODIUM SERPL-SCNC: 140 MMOL/L (ref 133–145)
T4 FREE SERPL-MCNC: 1.2 NG/DL (ref 0.9–1.8)
TRIGL SERPL-MCNC: 56 MG/DL (ref 40–149)
TSH SERPL DL<=0.005 MIU/L-ACNC: 1.15 MCU/ML (ref 0.27–4.2)
VLDLC SERPL CALC-MCNC: 11 MG/DL (ref 8–30)
WBC # BLD AUTO: 4.6 K/UL (ref 4–11)

## 2022-07-18 ENCOUNTER — TELEPHONE (OUTPATIENT)
Dept: INTERNAL MEDICINE CLINIC | Age: 62
End: 2022-07-18

## 2022-07-18 NOTE — TELEPHONE ENCOUNTER
----- Message from [place] sent at 7/18/2022 11:45 AM EDT -----  Subject: Results Request    QUESTIONS  Results: bloodwork; Ordered by: Deepali Leong   Date Performed: 2022-07-11  ---------------------------------------------------------------------------  --------------  Valorie Diehl [facility]    [phone]; OK to leave message on voicemail, OK to respond with   electronic message via [employer] portal (only for patients who have   registered [employer] account)  ---------------------------------------------------------------------------  --------------

## 2022-07-18 NOTE — TELEPHONE ENCOUNTER
[facility] call      Results for orders placed or performed in visit on 66/78/76   METABOLIC PANEL, COMPREHENSIVE   Result Value Ref Range    Glucose 104 (H) 70 - 99 mg/dL    BUN 13 6 - 22 mg/dL    Creatinine 0.7 (L) 0.8 - 1.4 mg/dL    Sodium 140 133 - 145 mmol/L    Potassium 4.2 3.5 - 5.5 mmol/L    Chloride 102 98 - 110 mmol/L    CO2 25 20 - 32 mmol/L    AST (SGOT) 21 10 - 37 U/L    ALT (SGPT) 22 5 - 40 U/L    Alk. phosphatase 100 40 - 120 U/L    Bilirubin, total 0.3 0.2 - 1.2 mg/dL    Calcium 8.7 8.4 - 10.5 mg/dL    Protein, total 6.8 6.2 - 8.1 g/dL    Albumin 4.1 3.5 - 5.0 g/dL    A-G Ratio 1.5 1.1 - 2.6 ratio    Globulin 2.7 2.0 - 4.0 g/dL    GLOMERULAR FILTRATION RATE >60.0 >60.0 mL/min/1.73 sq.m.     Anion gap 13.0 3.0 - 15.0 mmol/L   LIPID PANEL   Result Value Ref Range    Triglyceride 56 40 - 149 mg/dL    HDL Cholesterol 67 >=40 mg/dL    Cholesterol, total 155 110 - 200 mg/dL    CHOLESTEROL/HDL 2.3 0.0 - 5.0    Non-HDL Cholesterol 88 <130 mg/dL    LDL, calculated 77 50 - 99 mg/dL    VLDL, calculated 11 8 - 30 mg/dL    LDL/HDL Ratio 1.1    CBC W/O DIFF   Result Value Ref Range    WBC 4.6 4.0 - 11.0 K/uL    RBC 4.04 3.80 - 5.20 M/uL    HGB 12.8 11.7 - 16.0 g/dL    HCT 40.0 35.1 - 48.0 %    MCV 99 80 - 99 fL    MCH 32 26 - 34 pg    MCHC 32 31 - 36 g/dL    RDW 13.2 10.0 - 15.5 %    PLATELET 617 057 - 296 K/uL    MPV 11.6 9.0 - 13.0 fL   HEMOGLOBIN A1C W/O EAG   Result Value Ref Range    Hemoglobin A1c 6.0 (H) 4.8 - 5.6 %    AVG  (H) 91 - 123 mg/dL   TSH 3RD GENERATION   Result Value Ref Range    TSH 1.15 0.27 - 4.20 mcU/mL   T4, FREE   Result Value Ref Range    T4, Free 1.2 0.9 - 1.8 ng/dL     Thyroid, cbc, chol, cmp ok  fbs 104 and a1c 6.0 - IFG/prediabetes - diet, exercise, wt loss  Recheck next visit

## 2022-07-25 ENCOUNTER — HOSPITAL ENCOUNTER (OUTPATIENT)
Dept: MAMMOGRAPHY | Age: 62
Discharge: HOME | End: 2022-07-25
Attending: INTERNAL MEDICINE
Payer: COMMERCIAL

## 2022-07-25 DIAGNOSIS — Z12.31 SCREENING MAMMOGRAM FOR BREAST CANCER: ICD-10-CM

## 2022-07-25 PROCEDURE — 77063 BREAST TOMOSYNTHESIS BI: CPT

## 2022-07-27 ENCOUNTER — TELEPHONE (OUTPATIENT)
Dept: INTERNAL MEDICINE CLINIC | Age: 62
End: 2022-07-27

## 2022-07-27 DIAGNOSIS — R92.8 ABNORMAL MAMMOGRAM: ICD-10-CM

## 2022-07-27 DIAGNOSIS — R92.8 ABNORMAL MAMMOGRAM: Primary | ICD-10-CM

## 2022-07-27 NOTE — TELEPHONE ENCOUNTER
IMPRESSION      No suspicious mass or suspicious calcifications are seen in the left breast.     Possible asymmetries at the posterior upper outer right breast and at the  lateral central right breast.     Recommend diagnostic right mammogram with 2-D/3-D spot compression views, ML  view, and possibly ultrasound of asymmetries in the right breast as described    Follow up diagnostic and US ordered

## 2022-07-28 NOTE — TELEPHONE ENCOUNTER
Pt returned call and given Dr Lor Costa message. She verbalized understanding.    She is aware the scheduling dept will be calling to schedule an appt

## 2022-08-16 ENCOUNTER — HOSPITAL ENCOUNTER (OUTPATIENT)
Dept: MAMMOGRAPHY | Age: 62
Discharge: HOME | End: 2022-08-16
Attending: INTERNAL MEDICINE
Payer: COMMERCIAL

## 2022-08-16 ENCOUNTER — HOSPITAL ENCOUNTER (OUTPATIENT)
Dept: ULTRASOUND IMAGING | Age: 62
Discharge: HOME | End: 2022-08-16
Attending: INTERNAL MEDICINE
Payer: COMMERCIAL

## 2022-08-16 DIAGNOSIS — R92.8 ABNORMAL MAMMOGRAM: ICD-10-CM

## 2022-08-16 PROCEDURE — 77061 BREAST TOMOSYNTHESIS UNI: CPT

## 2022-08-16 PROCEDURE — 77063 BREAST TOMOSYNTHESIS BI: CPT

## 2022-08-16 PROCEDURE — 76642 ULTRASOUND BREAST LIMITED: CPT

## 2022-10-13 NOTE — PERIOP NOTES
PRE-SURGICAL INSTRUCTIONS        Patient's Name:  Tricia Hancock      [id number] Date:  10/13/2022    Surgery Date:  10/18/2022                Do NOT eat or drink anything, including candy, gum, or ice chips after midnight on 10/17/2022, unless you have specific instructions from your surgeon or anesthesia provider to do so. You may brush your teeth before coming to the hospital.  No smoking 24 hours prior to the day of surgery. No alcohol 24 hours prior to the day of surgery. No recreational drugs for one week prior to the day of surgery. Leave all valuables, including money/purse, at home. Remove all jewelry, nail polish, acrylic nails, and makeup (including mascara); no lotions powders, deodorant, or perfume/cologne/after shave on the skin. Follow instruction for Hibiclens washes and CHG wipes from surgeon's office. Glasses/contact lenses and dentures may be worn to the hospital.  They will be removed prior to surgery. Call your doctor if symptoms of a cold or illness develop within 24-48 hours prior to your surgery. 11.  If you are having an outpatient procedure, please make arrangements for a responsible ADULT TO [address] [place] [address] and stay with you for 24 hours after your surgery. 12. ONE VISITOR in the hospital at this time for outpatient procedures. Exceptions may be made for surgical admissions, per nursing unit guidelines      Special Instructions:      Bring list of CURRENT medications. Bring inhaler. Bring any pertinent legal medical records. Take these medications the morning of surgery with a sip of water:  blood pressure medications as directed by physician  Follow physician instructions about stopping anticoagulants. Complete bowel prep per MD instructions. On the day of surgery, come in the main entrance of [facility]. Let the  at the desk know you are there for surgery.   A staff member will come escort you to the surgical area on the second floor. If you have any questions or concerns, please do not hesitate to call:     (Prior to the day of surgery) PAT department:  [phone]   (Day of surgery) Pre-Op department:  [phone]    These surgical instructions were reviewed with patient during the PAT phone call.

## 2022-10-17 ENCOUNTER — ANESTHESIA EVENT (OUTPATIENT)
Dept: ENDOSCOPY | Age: 62
End: 2022-10-17
Payer: COMMERCIAL

## 2022-10-18 ENCOUNTER — ANESTHESIA (OUTPATIENT)
Dept: ENDOSCOPY | Age: 62
End: 2022-10-18
Payer: COMMERCIAL

## 2022-10-18 ENCOUNTER — HOSPITAL ENCOUNTER (OUTPATIENT)
Age: 62
Setting detail: OUTPATIENT SURGERY
Discharge: HOME | End: 2022-10-18
Attending: INTERNAL MEDICINE | Admitting: INTERNAL MEDICINE
Payer: COMMERCIAL

## 2022-10-18 VITALS
TEMPERATURE: 97.1 F | BODY MASS INDEX: 42.35 KG/M2 | HEIGHT: 63 IN | RESPIRATION RATE: 12 BRPM | HEART RATE: 63 BPM | OXYGEN SATURATION: 98 % | DIASTOLIC BLOOD PRESSURE: 76 MMHG | SYSTOLIC BLOOD PRESSURE: 128 MMHG | WEIGHT: 239 LBS

## 2022-10-18 PROCEDURE — 00811 ANES LWR INTST NDSC NOS: CPT | Performed by: STUDENT IN AN ORGANIZED HEALTH CARE EDUCATION/TRAINING PROGRAM

## 2022-10-18 PROCEDURE — 74011250636 HC RX REV CODE- 250/636: Performed by: NURSE ANESTHETIST, CERTIFIED REGISTERED

## 2022-10-18 PROCEDURE — 00811 ANES LWR INTST NDSC NOS: CPT | Performed by: NURSE ANESTHETIST, CERTIFIED REGISTERED

## 2022-10-18 PROCEDURE — 74011250637 HC RX REV CODE- 250/637: Performed by: NURSE ANESTHETIST, CERTIFIED REGISTERED

## 2022-10-18 PROCEDURE — 2709999900 HC NON-CHARGEABLE SUPPLY: Performed by: INTERNAL MEDICINE

## 2022-10-18 PROCEDURE — 77030008565 HC TBNG SUC IRR ERBE -B: Performed by: INTERNAL MEDICINE

## 2022-10-18 PROCEDURE — 77030013992 HC SNR POLYP ENDOSC BSC -B: Performed by: INTERNAL MEDICINE

## 2022-10-18 PROCEDURE — 76060000031 HC ANESTHESIA FIRST 0.5 HR: Performed by: INTERNAL MEDICINE

## 2022-10-18 PROCEDURE — 76040000019: Performed by: INTERNAL MEDICINE

## 2022-10-18 PROCEDURE — 88305 TISSUE EXAM BY PATHOLOGIST: CPT

## 2022-10-18 PROCEDURE — 74011000250 HC RX REV CODE- 250: Performed by: NURSE ANESTHETIST, CERTIFIED REGISTERED

## 2022-10-18 PROCEDURE — 74011000258 HC RX REV CODE- 258: Performed by: NURSE ANESTHETIST, CERTIFIED REGISTERED

## 2022-10-18 RX ORDER — SODIUM CHLORIDE, SODIUM LACTATE, POTASSIUM CHLORIDE, CALCIUM CHLORIDE 600; 310; 30; 20 MG/100ML; MG/100ML; MG/100ML; MG/100ML
25 INJECTION, SOLUTION INTRAVENOUS CONTINUOUS
Status: DISCONTINUED | OUTPATIENT
Start: 2022-10-18 | End: 2022-10-18 | Stop reason: HOSPADM

## 2022-10-18 RX ORDER — LIDOCAINE HYDROCHLORIDE 10 MG/ML
0.1 INJECTION, SOLUTION EPIDURAL; INFILTRATION; INTRACAUDAL; PERINEURAL AS NEEDED
Status: DISCONTINUED | OUTPATIENT
Start: 2022-10-18 | End: 2022-10-18 | Stop reason: HOSPADM

## 2022-10-18 RX ORDER — LIDOCAINE HYDROCHLORIDE 20 MG/ML
INJECTION, SOLUTION EPIDURAL; INFILTRATION; INTRACAUDAL; PERINEURAL AS NEEDED
Status: DISCONTINUED | OUTPATIENT
Start: 2022-10-18 | End: 2022-10-18 | Stop reason: HOSPADM

## 2022-10-18 RX ORDER — INSULIN LISPRO 100 [IU]/ML
INJECTION, SOLUTION INTRAVENOUS; SUBCUTANEOUS ONCE
Status: DISCONTINUED | OUTPATIENT
Start: 2022-10-18 | End: 2022-10-18 | Stop reason: HOSPADM

## 2022-10-18 RX ORDER — PROPOFOL 10 MG/ML
INJECTION, EMULSION INTRAVENOUS AS NEEDED
Status: DISCONTINUED | OUTPATIENT
Start: 2022-10-18 | End: 2022-10-18 | Stop reason: HOSPADM

## 2022-10-18 RX ORDER — LABETALOL HYDROCHLORIDE 5 MG/ML
INJECTION, SOLUTION INTRAVENOUS AS NEEDED
Status: DISCONTINUED | OUTPATIENT
Start: 2022-10-18 | End: 2022-10-18 | Stop reason: HOSPADM

## 2022-10-18 RX ORDER — FAMOTIDINE 20 MG/1
20 TABLET, FILM COATED ORAL ONCE
Status: COMPLETED | OUTPATIENT
Start: 2022-10-18 | End: 2022-10-18

## 2022-10-18 RX ADMIN — LABETALOL HYDROCHLORIDE 10 MG: 5 INJECTION, SOLUTION INTRAVENOUS at 07:51

## 2022-10-18 RX ADMIN — SODIUM CHLORIDE, POTASSIUM CHLORIDE, SODIUM LACTATE AND CALCIUM CHLORIDE 25 ML/HR: 600; 310; 30; 20 INJECTION, SOLUTION INTRAVENOUS at 07:29

## 2022-10-18 RX ADMIN — SODIUM CHLORIDE 10 MCG: 9 INJECTION, SOLUTION INTRAVENOUS at 07:43

## 2022-10-18 RX ADMIN — LIDOCAINE HYDROCHLORIDE 60 MG: 20 INJECTION, SOLUTION EPIDURAL; INFILTRATION; INTRACAUDAL; PERINEURAL at 07:47

## 2022-10-18 RX ADMIN — PROPOFOL 50 MG: 10 INJECTION, EMULSION INTRAVENOUS at 07:24

## 2022-10-18 RX ADMIN — PROPOFOL 100 MG: 10 INJECTION, EMULSION INTRAVENOUS at 07:47

## 2022-10-18 RX ADMIN — FAMOTIDINE 20 MG: 20 TABLET ORAL at 07:27

## 2022-10-18 NOTE — H&P
[email or web address] [address]  [address] [place], [address]        Impression:   1.rectal bleed in chart but pt denies   2. crcs      Plan:     1. [place]       Chief Complaint: rectal bleed      HPI:  Austen Tabares is a 58 y.o. female who is being seen on consult for rectal bleed which is in chart but denied today and crcs. Alessandra Jung     PMH:   Past Medical History:   Diagnosis Date    Acquired hypothyroidism 07/05/2022    Allergic rhinitis     Anxiety     WADE-7 was 6/21    Chronic venous hypertension without complications 60/38/3682    Colon polyp 06/2017    Dr Baltazar Avila    COVID-19 virus infection 12/2021    Depression 1980s    zoloft    FHx: heart disease     Grave's disease 2003    and hyperthyroidism Dr. Altagracia Strong    Hypertension     neg w/u secondary causes 3/15 Dr Rosanne Rasmussen    Hypovitaminosis D     Morbid obesity ([address]     peak weight 225 lbs, bmi 39.1 from 2/15; has seen nutritionist in past    Sleep apnea     not using cpap    Subacromial bursitis     Venous reflux     12/14 EMILY Rodriguez       PSH:   Past Surgical History:   Procedure Laterality Date    HX CATARACT REMOVAL Bilateral 2021    HX COLONOSCOPY      Dr Baltazar Avila 7/3/12 negative; 6/21/17 polyp    HX CHADWICK AND BSO      NEUROLOGICAL PROCEDURE UNLISTED  3/15    ct head negative    [place] CARDIAC SURG PROCEDURE UNLIST  1/14    negative NST ef 65%    VASCULAR SURGERY PROCEDURE UNLIST  2017    left leg saphenous vein closure Dr Eva Hoblrook HX:   Social History     Socioeconomic History    Marital status:      Spouse name: Not on file    Number of children: 3    Years of education: Not on file    Highest education level: Not on file   Occupational History    Occupation: works at [employer]   Tobacco Use    Smoking status: Never    Smokeless tobacco: Never   Vaping Use    Vaping Use: Never used   Substance and Sexual Activity    Alcohol use: No    Drug use: Never    Sexual activity: Not Currently   Other Topics Concern    Not on file   Social History Narrative    Not on file     Social Determinants of Health     Financial Resource Strain: Not on file   Food Insecurity: Not on file   Transportation Needs: Not on file   Physical Activity: Not on file   Stress: Not on file   Social Connections: Not on file   Intimate Partner Violence: Not on file   Housing Stability: Not on file       FHX:   Family History   Problem Relation Age of Onset    Heart Disease Father     Hypertension Sister     Heart Disease Mother     Seizures Sister     Sudden Death Neg Hx     Heart Attack Neg Hx        Allergy:   No Known Allergies    Home Medications:     Medications Prior to Admission   Medication Sig    Premarin 0.625 mg tablet take 1 tablet by mouth once daily    carvediloL (COREG) 25 mg tablet Take 1 Tablet by mouth two (2) times daily (with meals). amLODIPine (NORVASC) 10 mg tablet take 1 tablet by mouth once daily    cholecalciferol (VITAMIN D3) (5000 Units/125 mcg) tab tablet Take  by mouth. albuterol (PROVENTIL HFA, VENTOLIN HFA, PROAIR HFA) 90 mcg/actuation inhaler Take 2 Puffs by inhalation every six (6) hours as needed for Wheezing. (Patient not taking: Reported on 10/18/2022)       Review of Systems:     Constitutional: No fevers, chills, weight loss, fatigue. Skin: No rashes, pruritis, jaundice, ulcerations, erythema. HENT: No headaches, nosebleeds, sinus pressure, rhinorrhea, sore throat. Eyes: No visual changes, blurred vision, eye pain, photophobia, jaundice. Cardiovascular: No chest pain, heart palpitations. Respiratory: No cough, SOB, wheezing, chest discomfort, orthopnea. Gastrointestinal: Neg denies hx of bleeding today   Genitourinary: No dysuria, bleeding, discharge, pyuria. Musculoskeletal: No weakness, arthralgias, wasting. Endo: No sweats. Heme: No bruising, easy bleeding. Allergies: As noted. Neurological: Cranial nerves intact. Alert and oriented. Gait not assessed.    Psychiatric:  No anxiety, depression, hallucinations. Visit Vitals  /74 (BP 1 Location: Left arm, BP Patient Position: At rest)   Pulse 72   Temp 98.8 °F (37.1 °C)   Resp 20   Ht 5' 3\" (1.6 m)   Wt 108.4 kg (239 lb)   SpO2 97%   Breastfeeding No   BMI 42.34 kg/m²       Physical Assessment:     constitutional: appearance: well developed, well nourished, normal habitus, no deformities, in no acute distress. skin: inspection: no rashes, ulcers, icterus or other lesions; no clubbing or telangiectasias. palpation: no induration or subcutaneos nodules. eyes: inspection: normal conjunctivae and lids; no jaundice pupils: symmetrical, normoreactive to light, normal accommodation and size. ENMT: mouth: normal oral mucosa,lips and gums; good dentition. oropharynx: normal tongue, hard and soft palate; posterior pharynx without erythema, exudate or lesions. neck: no masses organomegaly or tenderness. respiratory: effort: normal chest excursion; no intercostal retraction or accessory muscle use. cardiovascular: abdominal aorta: normal size and position; no bruits. palpation: PMI of normal size and position; normal rhythm; no thrill or murmurs. abdominal: abdomen: normal consistency; no tenderness or masses. hernias: no hernias appreciated. liver: normal size and consistency. spleen: not palpable. rectal: hemoccult/guaiac: not performed. musculoskeletal: no deformities or muscle wasting   lymphatic: axilae: not palpable. groin: not palpable. neck: within normal limits. other: not palpable. neurologic: cranial nerves: II-XII normal.   psychiatric: judgement/insight: within normal limits. memory: within normal limits for recent and remote events. mood and affect: no evidence of depression, anxiety or agitation. orientation: oriented to time, space and person. Basic Metabolic Profile   No results for input(s): NA, K, CL, CO2, BUN, GLU, CA, MG, PHOS in the last 72 hours.     No lab exists for component: CREAT      CBC w/Diff    No results for input(s): WBC, RBC, HGB, HCT, MCV, MCH, MCHC, RDW, PLT, HGBEXT, HCTEXT, PLTEXT in the last 72 hours. No lab exists for component: MPV No results for input(s): GRANS, LYMPH, EOS, PRO, MYELO, METAS, BLAST in the last 72 hours. No lab exists for component: MONO, BASO     Hepatic Function   No results for input(s): ALB, TP, TBILI, AP, AML, LPSE in the last 72 hours. No lab exists for component: DBILI, GPT, SGOT       Fina Kang MD, M.D. [facility], [address] [place] [address]  [email or web address]

## 2022-10-18 NOTE — DISCHARGE INSTRUCTIONS
Colonoscopy: What to Expect at [address] [place] [address]  After a colonoscopy, you'll stay at the clinic until you wake up. Then you can go home. But you'll need to arrange for a ride. Your doctor will tell you when you can eat and do your other usual activities. Your doctor will talk to you about when you'll need your next colonoscopy. Your doctor can help you decide how often you need to be checked. This will depend on the results of your test and your risk for colorectal cancer. After the test, you may be bloated or have gas pains. You may need to pass gas. If a biopsy was done or a polyp was removed, you may have streaks of blood in your stool (feces) for a few days. Problems such as heavy rectal bleeding may not occur until several weeks after the test. This isn't common. But it can happen after polyps are removed. This care sheet gives you a general idea about how long it will take for you to recover. But each person recovers at a different pace. Follow the steps below to get better as quickly as possible. How can you care for yourself at home? Activity    Rest when you feel tired. You can do your normal activities when it feels okay to do so. Diet    Follow your doctor's directions for eating. Unless your doctor has told you not to, drink plenty of fluids. This helps to replace the fluids that were lost during the colon prep. Do not drink alcohol. Medicines    Your doctor will tell you if and when you can restart your medicines. You will also be given instructions about taking any new medicines. If you take aspirin or some other blood thinner, ask your doctor if and when to start taking it again. Make sure that you understand exactly what your doctor wants you to do. If polyps were removed or a biopsy was done during the test, your doctor may tell you not to take aspirin or other anti-inflammatory medicines for a few days. These include ibuprofen (Advil, Motrin) and naproxen (Aleve). Other instructions    For your safety, do not drive or operate machinery until the medicine wears off and you can think clearly. Your doctor may tell you not to drive or operate machinery until the day after your test.     Do not sign legal documents or make major decisions until the medicine wears off and you can think clearly. The anesthesia can make it hard for you to fully understand what you are agreeing to. Follow-up care is a key part of your treatment and safety. Be sure to make and go to all appointments, and call your doctor if you are having problems. It's also a good idea to know your test results and keep a list of the medicines you take. When should you call for help? Call 911 anytime you think you may need emergency care. For example, call if:    You passed out (lost consciousness). You pass maroon or bloody stools. You have trouble breathing. Call your doctor now or seek immediate medical care if:    You have pain that does not get better after you take pain medicine. You are sick to your stomach or cannot drink fluids. You have new or worse belly pain. You have blood in your stools. You have a fever. You cannot pass stools or gas. Watch closely for changes in your health, and be sure to contact your doctor if you have any problems. Where can you learn more? Go to http://www.gray.com/  Enter E264 in the search box to learn more about \"Colonoscopy: What to Expect at Home. \"  Current as of: September 8, 2021               Content Version: 13.2  © 2006-2022 Healthwise, Incorporated. Care instructions adapted under license by [employer] (which disclaims liability or warranty for this information). If you have questions about a medical condition or this instruction, always ask your healthcare professional. [place] [address] any warranty or liability for your use of this information.     DISCHARGE SUMMARY from Nurse     POST-PROCEDURE INSTRUCTIONS:    Call your Physician if you:  Observe any excess bleeding. Develop a temperature over 100.5o F. Experience abdominal, shoulder or chest pain. Notice any signs of decreased circulation or nerve impairment to an extremity such as a change in color, persistent numbness, tingling, coldness or increase in pain. Vomit blood or you have nausea and vomiting lasting longer than 4 hours. Are unable to take medications. Are unable to urinate within 8 hours after discharge following general anesthesia or intravenous sedation. For the next 24 hours after receiving general anesthesia or intravenous sedation, or while taking prescription Narcotics, limit your activities:  Do NOT drive a motor vehicle, operate hazard machinery or power tools, or perform tasks that require coordination. The medication you received during your procedure may have some effect on your mental awareness. Do NOT make important personal or business decisions. The medication you received during your procedure may have some effect on your mental awareness. Do NOT drink alcoholic beverages. These drinks do not mix well with the medications that have been given to you. Upon discharge from the hospital, you must be accompanied by a responsible adult. Resume your diet as directed by your physician. Resume medications as your physician has prescribed. Please give a list of your current medications to your Primary Care Provider. Please update this list whenever your medications are discontinued, doses are changed, or new medications (including over-the-counter products) are added. Please carry medication information at all times in case of emergency situations. These are general instructions for a healthy lifestyle:    No smoking/ No tobacco products/ Avoid exposure to second hand smoke.   Surgeon General's Warning:  Quitting smoking now greatly reduces serious risk to your health. Obesity, smoking, and a sedentary lifestyle greatly increase your risk for illness. A healthy diet, regular physical exercise & weight monitoring are important for maintaining a healthy lifestyle  You may be retaining fluid if you have a history of heart failure or if you experience any of the following symptoms:  Weight gain of 3 pounds or more overnight or 5 pounds in a week, increased swelling in our hands or feet or shortness of breath while lying flat in bed. Please call your doctor as soon as you notice any of these symptoms; do not wait until your next office visit. Recognize signs and symptoms of STROKE:  F  -  Face looks uneven  A  -  Arms unable to move or move unevenly  S  -  Speech slurred or non-existent  T  -  Time to call 911 - as soon as signs and symptoms begin - DO NOT go back to bed or wait to see If you get better - TIME IS BRAIN. Colorectal Screening  Colorectal cancer almost always develops from precancerous polyps (abnormal growths) in the colon or rectum. Screening tests can find precancerous polyps, so that they can be removed before they turn into cancer. Screening tests can also find colorectal cancer early, when treatment works best.  Speak with your physician about when you should begin screening and how often you should be tested. [employer] Activation    Thank you for requesting access to [employer]. Please follow the instructions below to securely access and download your online medical record. [employer] allows you to send messages to your doctor, view your test results, renew your prescriptions, schedule appointments, and more. How Do I Sign Up? In your internet browser, go to [email or web address]. Click on the First Time User? Click Here link in the Sign In box. You will see the New Member Sign Up page. Enter your CH[employer] Access Code exactly as it appears below.  You will not need to use this code after youve completed the sign-up process. If you do not sign up before the expiration date, you must request a new code. The Bully Tracker Access Code: Activation code not generated  Current The Bully Tracker Status: Active (This is the date your [employer]fan[employer] access code will )    Enter the last four digits of your Social Security Number (xxxx) and Date of Birth (mm/dd/yyyy) as indicated and click Submit. You will be taken to the next sign-up page. Create a [employer]fan[employer] ID. This will be your The Bul[employer] Tracker login ID and cannot be changed, so think of one that is secure and easy to remember. Create a [employer]acker password. You can change your password at any time. Enter your Password Reset Question and Answer. This can be used at a later time if you forget your password. Enter your e-mail address. You will receive e-mail notification when new information is available in [address] Click Sign Up. You can now view and download portions of your medical record. Click the Bandhappy link to download a portable copy of your medical information. Additional Information    If you have questions, please call [phone]. Remember, The Bul[employer] Tracker is NOT to be used for urgent needs. For medical emergencies, dial 911. Educational references and/or instructions provided during this visit included:    See Attached      APPOINTMENTS:    Per MD Instruction    Discharge information has been reviewed with the patient. The patient verbalized understanding.

## 2022-10-18 NOTE — ANESTHESIA PREPROCEDURE EVALUATION
Relevant Problems   CARDIOVASCULAR   (+) Essential hypertension      ENDOCRINE   (+) Acquired hypothyroidism   (+) Severe obesity (BMI 35.0-39. 9) with comorbidity ([address]       Anesthetic History   No history of anesthetic complications            Review of Systems / Medical History  Patient summary reviewed, nursing notes reviewed and pertinent labs reviewed    Pulmonary              Pertinent negatives: No sleep apnea     Neuro/Psych         Psychiatric history     Cardiovascular    Hypertension: well controlled                   GI/Hepatic/Renal  Within defined limits              Endo/Other        Morbid obesity  Pertinent negatives: No hypothyroidism   Other Findings              Physical Exam    Airway  Mallampati: II  TM Distance: 4 - 6 cm  Neck ROM: normal range of motion   Mouth opening: Normal     Cardiovascular    Rhythm: regular  Rate: normal         Dental  No notable dental hx       Pulmonary  Breath sounds clear to auscultation               Abdominal  GI exam deferred       Other Findings            Anesthetic Plan    ASA: 3  Anesthesia type: MAC          Induction: Intravenous  Anesthetic plan and risks discussed with: Patient

## 2022-10-18 NOTE — ANESTHESIA POSTPROCEDURE EVALUATION
Procedure(s):  COLONOSCOPY wtih Polypectomies. MAC    Anesthesia Post Evaluation      Multimodal analgesia: multimodal analgesia used between 6 hours prior to anesthesia start to PACU discharge  Patient location during evaluation: PACU  Patient participation: complete - patient participated  Level of consciousness: sleepy but conscious  Pain management: adequate  Airway patency: patent  Anesthetic complications: no  Cardiovascular status: acceptable  Respiratory status: acceptable  Hydration status: acceptable  Post anesthesia nausea and vomiting:  controlled  Final Post Anesthesia Temperature Assessment:  Normothermia (36.0-37.5 degrees C)      INITIAL Post-op Vital signs:   Vitals Value Taken Time   /59 10/18/22 0809   Temp 36.6 °C (97.8 °F) 10/18/22 0809   Pulse 65 10/18/22 0812   Resp 11 10/18/22 0812   SpO2 97 % 10/18/22 0812   Vitals shown include unvalidated device data.

## 2022-12-16 RX ORDER — AMLODIPINE BESYLATE 10 MG/1
TABLET ORAL
Qty: 90 TABLET | Refills: 3 | Status: SHIPPED | OUTPATIENT
Start: 2022-12-16

## 2023-01-03 DIAGNOSIS — E03.9 ACQUIRED HYPOTHYROIDISM: ICD-10-CM

## 2023-01-03 DIAGNOSIS — Z00.00 PHYSICAL EXAM: ICD-10-CM

## 2023-01-05 DIAGNOSIS — E03.9 ACQUIRED HYPOTHYROIDISM: ICD-10-CM

## 2023-01-05 DIAGNOSIS — Z00.00 PHYSICAL EXAM: ICD-10-CM

## 2023-06-23 DIAGNOSIS — Z00.00 PHYSICAL EXAM: Primary | ICD-10-CM

## 2023-06-23 DIAGNOSIS — E03.9 ACQUIRED HYPOTHYROIDISM: ICD-10-CM

## 2023-06-28 ENCOUNTER — TELEPHONE (OUTPATIENT)
Age: 63
End: 2023-06-28

## 2023-06-29 ENCOUNTER — NURSE ONLY (OUTPATIENT)
Age: 63
End: 2023-06-29

## 2023-06-29 DIAGNOSIS — Z00.00 PHYSICAL EXAM: ICD-10-CM

## 2023-06-29 DIAGNOSIS — E03.9 ACQUIRED HYPOTHYROIDISM: ICD-10-CM

## 2023-06-30 LAB
A/G RATIO: 1.6 RATIO (ref 1.1–2.6)
ALBUMIN SERPL-MCNC: 4.5 G/DL (ref 3.5–5)
ALP BLD-CCNC: 122 U/L (ref 40–120)
ALT SERPL-CCNC: 19 U/L (ref 5–40)
ANION GAP SERPL CALCULATED.3IONS-SCNC: 11 MMOL/L (ref 3–15)
AST SERPL-CCNC: 22 U/L (ref 10–37)
AVERAGE GLUCOSE: 121 MG/DL (ref 91–123)
BASOPHILS # BLD: 1 % (ref 0–2)
BASOPHILS ABSOLUTE: 0 K/UL (ref 0–0.2)
BILIRUB SERPL-MCNC: 0.3 MG/DL (ref 0.2–1.2)
BUN BLDV-MCNC: 12 MG/DL (ref 6–22)
CALCIUM SERPL-MCNC: 9.2 MG/DL (ref 8.4–10.5)
CHLORIDE BLD-SCNC: 98 MMOL/L (ref 98–110)
CHOLESTEROL/HDL RATIO: 2.3 (ref 0–5)
CHOLESTEROL: 176 MG/DL (ref 110–200)
CO2: 25 MMOL/L (ref 20–32)
CREAT SERPL-MCNC: 0.8 MG/DL (ref 0.8–1.4)
EOSINOPHIL # BLD: 2 % (ref 0–6)
EOSINOPHILS ABSOLUTE: 0.1 K/UL (ref 0–0.5)
GLOBULIN: 2.8 G/DL (ref 2–4)
GLOMERULAR FILTRATION RATE: >60 ML/MIN/1.73 SQ.M.
GLUCOSE: 102 MG/DL (ref 70–99)
HBA1C MFR BLD: 5.8 % (ref 4.8–5.6)
HCT VFR BLD CALC: 40.9 % (ref 35.1–48)
HDLC SERPL-MCNC: 75 MG/DL
HEMOGLOBIN: 13.5 G/DL (ref 11.7–16)
LDL CHOLESTEROL CALCULATED: 91 MG/DL (ref 50–99)
LDL/HDL RATIO: 1.2
LYMPHOCYTES # BLD: 49 % (ref 20–45)
LYMPHOCYTES ABSOLUTE: 2.7 K/UL (ref 1–4.8)
MCH RBC QN AUTO: 32 PG (ref 26–34)
MCHC RBC AUTO-ENTMCNC: 33 G/DL (ref 31–36)
MCV RBC AUTO: 98 FL (ref 80–99)
MONOCYTES ABSOLUTE: 0.5 K/UL (ref 0.1–1)
MONOCYTES: 10 % (ref 3–12)
NEUTROPHILS ABSOLUTE: 2 K/UL (ref 1.8–7.7)
NEUTROPHILS: 38 % (ref 40–75)
NON-HDL CHOLESTEROL: 101 MG/DL
PDW BLD-RTO: 13.2 % (ref 10–15.5)
PLATELET # BLD: 215 K/UL (ref 140–440)
PMV BLD AUTO: 11.5 FL (ref 9–13)
POTASSIUM SERPL-SCNC: 4 MMOL/L (ref 3.5–5.5)
RBC: 4.18 M/UL (ref 3.8–5.2)
SODIUM BLD-SCNC: 134 MMOL/L (ref 133–145)
T4 FREE: 1.3 NG/DL (ref 0.9–1.8)
TOTAL PROTEIN: 7.3 G/DL (ref 6.2–8.1)
TRIGL SERPL-MCNC: 51 MG/DL (ref 40–149)
TSH SERPL DL<=0.05 MIU/L-ACNC: 0.9 MCU/ML (ref 0.27–4.2)
VLDLC SERPL CALC-MCNC: 10 MG/DL (ref 8–30)
WBC: 5.4 K/UL (ref 4–11)

## 2023-07-03 NOTE — PROGRESS NOTES
Evelin Boston presents today for   Chief Complaint   Patient presents with    Annual Exam    Discuss Labs     6-29-23    Hypothyroidism    Hypertension     1. \"Have you been to the ER, urgent care clinic since your last visit? Hospitalized since your last visit? \" no    2. \"Have you seen or consulted any other health care providers outside of the [address] [place] [address] since your last visit? \" no     3. For patients aged 43-73: Has the patient had a colonoscopy / FIT/ Cologuard? Yes - no Care Gap present      If the patient is female:    4. For patients aged 43-66: Has the patient had a mammogram within the past 2 years? Yes - no Care Gap present      5. For patients aged 21-65: Has the patient had a pap smear?  Yes - no Care Gap present

## 2023-07-05 ENCOUNTER — OFFICE VISIT (OUTPATIENT)
Age: 63
End: 2023-07-05
Payer: COMMERCIAL

## 2023-07-05 VITALS
TEMPERATURE: 98.4 F | RESPIRATION RATE: 18 BRPM | OXYGEN SATURATION: 99 % | HEIGHT: 63 IN | WEIGHT: 243 LBS | BODY MASS INDEX: 43.05 KG/M2 | DIASTOLIC BLOOD PRESSURE: 79 MMHG | HEART RATE: 78 BPM | SYSTOLIC BLOOD PRESSURE: 121 MMHG

## 2023-07-05 DIAGNOSIS — I10 ESSENTIAL HYPERTENSION: ICD-10-CM

## 2023-07-05 DIAGNOSIS — R60.0 LEG EDEMA, LEFT: ICD-10-CM

## 2023-07-05 DIAGNOSIS — Z00.00 PHYSICAL EXAM: ICD-10-CM

## 2023-07-05 DIAGNOSIS — R07.89 ATYPICAL CHEST PAIN: ICD-10-CM

## 2023-07-05 DIAGNOSIS — R73.01 IFG (IMPAIRED FASTING GLUCOSE): ICD-10-CM

## 2023-07-05 DIAGNOSIS — R06.09 DOE (DYSPNEA ON EXERTION): Primary | ICD-10-CM

## 2023-07-05 DIAGNOSIS — E03.9 ACQUIRED HYPOTHYROIDISM: ICD-10-CM

## 2023-07-05 DIAGNOSIS — K21.9 GASTROESOPHAGEAL REFLUX DISEASE, UNSPECIFIED WHETHER ESOPHAGITIS PRESENT: ICD-10-CM

## 2023-07-05 DIAGNOSIS — E66.01 SEVERE OBESITY (BMI 35.0-39.9) WITH COMORBIDITY (HCC): ICD-10-CM

## 2023-07-05 PROCEDURE — 3074F SYST BP LT 130 MM HG: CPT | Performed by: INTERNAL MEDICINE

## 2023-07-05 PROCEDURE — 99215 OFFICE O/P EST HI 40 MIN: CPT | Performed by: INTERNAL MEDICINE

## 2023-07-05 PROCEDURE — 3078F DIAST BP <80 MM HG: CPT | Performed by: INTERNAL MEDICINE

## 2023-07-05 PROCEDURE — 93000 ELECTROCARDIOGRAM COMPLETE: CPT | Performed by: INTERNAL MEDICINE

## 2023-07-05 RX ORDER — OMEPRAZOLE 40 MG/1
40 CAPSULE, DELAYED RELEASE ORAL
Qty: 90 CAPSULE | Refills: 1 | Status: SHIPPED | OUTPATIENT
Start: 2023-07-05

## 2023-07-05 RX ORDER — AMLODIPINE BESYLATE 5 MG/1
5 TABLET ORAL DAILY
Qty: 90 TABLET | Refills: 1 | Status: SHIPPED | OUTPATIENT
Start: 2023-07-05

## 2023-07-05 RX ORDER — TRIAMTERENE AND HYDROCHLOROTHIAZIDE 37.5; 25 MG/1; MG/1
1 CAPSULE ORAL DAILY
Qty: 30 CAPSULE | Refills: 3 | Status: SHIPPED | OUTPATIENT
Start: 2023-07-05

## 2023-07-05 SDOH — ECONOMIC STABILITY: INCOME INSECURITY: HOW HARD IS IT FOR YOU TO PAY FOR THE VERY BASICS LIKE FOOD, HOUSING, MEDICAL CARE, AND HEATING?: NOT HARD AT ALL

## 2023-07-05 SDOH — ECONOMIC STABILITY: HOUSING INSECURITY
IN THE LAST 12 MONTHS, WAS THERE A TIME WHEN YOU DID NOT HAVE A STEADY PLACE TO SLEEP OR SLEPT IN A SHELTER (INCLUDING NOW)?: NO

## 2023-07-05 SDOH — ECONOMIC STABILITY: FOOD INSECURITY: WITHIN THE PAST 12 MONTHS, YOU WORRIED THAT YOUR FOOD WOULD RUN OUT BEFORE YOU GOT MONEY TO BUY MORE.: NEVER TRUE

## 2023-07-05 SDOH — ECONOMIC STABILITY: FOOD INSECURITY: WITHIN THE PAST 12 MONTHS, THE FOOD YOU BOUGHT JUST DIDN'T LAST AND YOU DIDN'T HAVE MONEY TO GET MORE.: NEVER TRUE

## 2023-07-05 ASSESSMENT — PATIENT HEALTH QUESTIONNAIRE - PHQ9
SUM OF ALL RESPONSES TO PHQ QUESTIONS 1-9: 0
SUM OF ALL RESPONSES TO PHQ QUESTIONS 1-9: 0
1. LITTLE INTEREST OR PLEASURE IN DOING THINGS: 0
2. FEELING DOWN, DEPRESSED OR HOPELESS: 0
SUM OF ALL RESPONSES TO PHQ QUESTIONS 1-9: 0
SUM OF ALL RESPONSES TO PHQ QUESTIONS 1-9: 0
SUM OF ALL RESPONSES TO PHQ9 QUESTIONS 1 & 2: 0

## 2023-07-18 ENCOUNTER — HOSPITAL ENCOUNTER (OUTPATIENT)
Facility: HOSPITAL | Age: 63
Discharge: HOME | End: 2023-07-20
Attending: INTERNAL MEDICINE
Payer: COMMERCIAL

## 2023-07-18 ENCOUNTER — HOSPITAL ENCOUNTER (OUTPATIENT)
Facility: HOSPITAL | Age: 63
Discharge: HOME | End: 2023-07-21
Attending: INTERNAL MEDICINE
Payer: COMMERCIAL

## 2023-07-18 VITALS
DIASTOLIC BLOOD PRESSURE: 78 MMHG | HEART RATE: 65 BPM | SYSTOLIC BLOOD PRESSURE: 128 MMHG | BODY MASS INDEX: 41.11 KG/M2 | WEIGHT: 232 LBS | HEIGHT: 63 IN

## 2023-07-18 DIAGNOSIS — R06.09 DOE (DYSPNEA ON EXERTION): ICD-10-CM

## 2023-07-18 LAB
ECHO BSA: 2.16 M2
NUC STRESS EJECTION FRACTION: 60 %
STRESS BASELINE DIAS BP: 78 MMHG
STRESS BASELINE HR: 65 BPM
STRESS BASELINE SYS BP: 128 MMHG
STRESS ESTIMATED WORKLOAD: 7 METS
STRESS EXERCISE DUR MIN: 6 MIN
STRESS EXERCISE DUR SEC: 0 SEC
STRESS PEAK DIAS BP: 98 MMHG
STRESS PEAK SYS BP: 162 MMHG
STRESS PERCENT HR ACHIEVED: 93 %
STRESS POST PEAK HR: 146 BPM
STRESS RATE PRESSURE PRODUCT: NORMAL BPM*MMHG
STRESS TARGET HR: 157 BPM
TID: 1.19

## 2023-07-18 PROCEDURE — 3430000000 HC RX DIAGNOSTIC RADIOPHARMACEUTICAL: Performed by: INTERNAL MEDICINE

## 2023-07-18 PROCEDURE — A9502 TC99M TETROFOSMIN: HCPCS | Performed by: INTERNAL MEDICINE

## 2023-07-18 PROCEDURE — 2580000003 HC RX 258: Performed by: INTERNAL MEDICINE

## 2023-07-18 PROCEDURE — 93017 CV STRESS TEST TRACING ONLY: CPT

## 2023-07-18 RX ORDER — SODIUM CHLORIDE 9 MG/ML
INJECTION, SOLUTION INTRAVENOUS ONCE
Status: COMPLETED | OUTPATIENT
Start: 2023-07-18 | End: 2023-07-18

## 2023-07-18 RX ORDER — REGADENOSON 0.08 MG/ML
0.4 INJECTION, SOLUTION INTRAVENOUS
Status: DISCONTINUED | OUTPATIENT
Start: 2023-07-18 | End: 2023-07-18

## 2023-07-18 RX ADMIN — TETROFOSMIN 33 MILLICURIE: 1.38 INJECTION, POWDER, LYOPHILIZED, FOR SOLUTION INTRAVENOUS at 08:45

## 2023-07-18 RX ADMIN — SODIUM CHLORIDE: 900 INJECTION, SOLUTION INTRAVENOUS at 08:45

## 2023-07-18 RX ADMIN — TETROFOSMIN 11 MILLICURIE: 1.38 INJECTION, POWDER, LYOPHILIZED, FOR SOLUTION INTRAVENOUS at 07:05

## 2023-07-19 ENCOUNTER — TELEPHONE (OUTPATIENT)
Age: 63
End: 2023-07-19

## 2023-07-19 DIAGNOSIS — R06.02 SOB (SHORTNESS OF BREATH): Primary | ICD-10-CM

## 2023-07-19 NOTE — TELEPHONE ENCOUNTER
Pls call    NST neg  Will need cxr, probnp - ordered - print out orders and give to pt pls  If unrevealing, may need pfts and echo  Any wheezing or chronic cough?

## 2023-07-19 NOTE — TELEPHONE ENCOUNTER
Patient called and wants to speak to someone about her test results in regards to a Nuclear Stress Test she had done recently.     Patient can be reached at [phone]

## 2023-07-20 ENCOUNTER — TELEPHONE (OUTPATIENT)
Age: 63
End: 2023-07-20

## 2023-07-20 NOTE — TELEPHONE ENCOUNTER
----- Message from Bel Rich sent at 7/20/2023  2:57 PM EDT -----  Subject: Message to Provider    QUESTIONS  Information for Provider? patient can view her labs now in [address] [place] [address] she   will not be coming to pickup paperwork about labs  ---------------------------------------------------------------------------  --------------  Lenore Palouse Corrine  [phone]; OK to leave message on voicemail,OK to respond with electronic   message via SendTask portal (only for patients who have registered SendTask   account)  ---------------------------------------------------------------------------  --------------  SCRIPT ANSWERS  Relationship to Patient?  Self

## 2023-07-24 RX ORDER — CARVEDILOL 25 MG/1
TABLET ORAL
Qty: 180 TABLET | OUTPATIENT
Start: 2023-07-24

## 2023-07-26 ENCOUNTER — HOSPITAL ENCOUNTER (OUTPATIENT)
Facility: HOSPITAL | Age: 63
Discharge: HOME | End: 2023-07-29

## 2023-07-26 LAB — SENTARA SPECIMEN COLLECTION: NORMAL

## 2023-07-27 ENCOUNTER — TELEPHONE (OUTPATIENT)
Age: 63
End: 2023-07-27

## 2023-07-27 ENCOUNTER — PATIENT MESSAGE (OUTPATIENT)
Age: 63
End: 2023-07-27

## 2023-07-27 DIAGNOSIS — R06.2 WHEEZING: Primary | ICD-10-CM

## 2023-07-27 LAB
A/G RATIO: 1.5 RATIO (ref 1.1–2.6)
ALBUMIN SERPL-MCNC: 4.4 G/DL (ref 3.5–5)
ALP BLD-CCNC: 114 U/L (ref 40–120)
ALT SERPL-CCNC: 17 U/L (ref 5–40)
ANION GAP SERPL CALCULATED.3IONS-SCNC: 11 MMOL/L (ref 3–15)
AST SERPL-CCNC: 20 U/L (ref 10–37)
AVERAGE GLUCOSE: 120 MG/DL (ref 91–123)
B-TYPE NATRIURETIC PEPTIDE: <36 PG/ML
BASOPHILS # BLD: 1 % (ref 0–2)
BASOPHILS ABSOLUTE: 0 K/UL (ref 0–0.2)
BILIRUB SERPL-MCNC: 0.3 MG/DL (ref 0.2–1.2)
BUN BLDV-MCNC: 17 MG/DL (ref 6–22)
CALCIUM SERPL-MCNC: 9.5 MG/DL (ref 8.4–10.5)
CHLORIDE BLD-SCNC: 102 MMOL/L (ref 98–110)
CHOLESTEROL/HDL RATIO: 2.6 (ref 0–5)
CHOLESTEROL: 177 MG/DL (ref 110–200)
CO2: 25 MMOL/L (ref 20–32)
CREAT SERPL-MCNC: 0.9 MG/DL (ref 0.8–1.4)
EOSINOPHIL # BLD: 2 % (ref 0–6)
EOSINOPHILS ABSOLUTE: 0.1 K/UL (ref 0–0.5)
GLOBULIN: 2.9 G/DL (ref 2–4)
GLOMERULAR FILTRATION RATE: >60 ML/MIN/1.73 SQ.M.
GLUCOSE: 92 MG/DL (ref 70–99)
HBA1C MFR BLD: 5.8 % (ref 4.8–5.6)
HCT VFR BLD CALC: 40.6 % (ref 35.1–48)
HDLC SERPL-MCNC: 68 MG/DL
HEMOGLOBIN: 13.4 G/DL (ref 11.7–16)
LDL CHOLESTEROL CALCULATED: 97 MG/DL (ref 50–99)
LDL/HDL RATIO: 1.4
LYMPHOCYTES # BLD: 48 % (ref 20–45)
LYMPHOCYTES ABSOLUTE: 2.5 K/UL (ref 1–4.8)
MCH RBC QN AUTO: 32 PG (ref 26–34)
MCHC RBC AUTO-ENTMCNC: 33 G/DL (ref 31–36)
MCV RBC AUTO: 96 FL (ref 80–99)
MONOCYTES ABSOLUTE: 0.5 K/UL (ref 0.1–1)
MONOCYTES: 10 % (ref 3–12)
NEUTROPHILS ABSOLUTE: 2 K/UL (ref 1.8–7.7)
NEUTROPHILS: 39 % (ref 40–75)
NON-HDL CHOLESTEROL: 109 MG/DL
PDW BLD-RTO: 13.2 % (ref 10–15.5)
PLATELET # BLD: 209 K/UL (ref 140–440)
PMV BLD AUTO: 11.4 FL (ref 9–13)
POTASSIUM SERPL-SCNC: 3.8 MMOL/L (ref 3.5–5.5)
RBC: 4.25 M/UL (ref 3.8–5.2)
SODIUM BLD-SCNC: 138 MMOL/L (ref 133–145)
T4 FREE: 1.3 NG/DL (ref 0.9–1.8)
TOTAL PROTEIN: 7.3 G/DL (ref 6.2–8.1)
TRIGL SERPL-MCNC: 59 MG/DL (ref 40–149)
TSH SERPL DL<=0.05 MIU/L-ACNC: 0.77 MCU/ML (ref 0.27–4.2)
VLDLC SERPL CALC-MCNC: 12 MG/DL (ref 8–30)
WBC: 5.1 K/UL (ref 4–11)

## 2023-07-27 RX ORDER — CARVEDILOL 25 MG/1
25 TABLET ORAL 2 TIMES DAILY WITH MEALS
Qty: 180 TABLET | Refills: 3 | Status: SHIPPED | OUTPATIENT
Start: 2023-07-27

## 2023-07-27 NOTE — TELEPHONE ENCOUNTER
Please sent refill to [facility]    carvediloL (COREG) 25 mg tablet 180 Tablet 3 2/1/2022     Sig - Route: Take 1 Tablet by mouth two (2) times daily (with meals).  - Oral

## 2023-08-04 RX ORDER — PREDNISONE 20 MG/1
20 TABLET ORAL 2 TIMES DAILY
Qty: 10 TABLET | Refills: 0 | Status: SHIPPED | OUTPATIENT
Start: 2023-08-04 | End: 2023-08-09

## 2023-08-04 RX ORDER — BUDESONIDE AND FORMOTEROL FUMARATE DIHYDRATE 160; 4.5 UG/1; UG/1
2 AEROSOL RESPIRATORY (INHALATION) 2 TIMES DAILY
Qty: 10.2 G | Refills: 5 | Status: SHIPPED | OUTPATIENT
Start: 2023-08-04

## 2023-08-07 ENCOUNTER — HOSPITAL ENCOUNTER (OUTPATIENT)
Facility: HOSPITAL | Age: 63
Discharge: HOME | End: 2023-08-10
Payer: COMMERCIAL

## 2023-08-07 DIAGNOSIS — R06.02 SOB (SHORTNESS OF BREATH): ICD-10-CM

## 2023-08-07 PROCEDURE — 71046 X-RAY EXAM CHEST 2 VIEWS: CPT

## 2023-08-25 ENCOUNTER — TELEPHONE (OUTPATIENT)
Age: 63
End: 2023-08-25

## 2023-08-25 DIAGNOSIS — R06.2 WHEEZING: Primary | ICD-10-CM

## 2023-08-25 RX ORDER — PREDNISONE 20 MG/1
20 TABLET ORAL 2 TIMES DAILY
Qty: 10 TABLET | Refills: 0 | Status: SHIPPED | OUTPATIENT
Start: 2023-08-25 | End: 2023-08-30

## 2023-08-25 NOTE — TELEPHONE ENCOUNTER
Refill request:    - predniSONE (DELTASONE) 20 MG tablet    Pharmacy: [facility]     Patient states this was helping with her wheezing, requesting if Dr Emilee Sterling will refill it.

## 2023-08-25 NOTE — TELEPHONE ENCOUNTER
Pt returning call and given Dr Jose Rafael Orozco message. She verballized understanding. I gave her the ph # to [facility] Scheduling.  She was also made aware RX was sent to the pharmacy

## 2023-08-25 NOTE — TELEPHONE ENCOUNTER
1st ATTEMPT:  Called pt, no answer. Lvm for her to call the office back regarding results of cxr, prescription for prednisone sent to pharmacy and tests he wants her to have done because she is still wheezing.        Cxr neg  Still with wheezing so schedule pfts pls  Pred burst sent

## 2023-09-25 ENCOUNTER — TELEPHONE (OUTPATIENT)
Age: 63
End: 2023-09-25

## 2023-09-25 NOTE — TELEPHONE ENCOUNTER
----- Message from Whitney Funez sent at 9/23/2023  7:55 PM EDT -----  Regarding: Uncomfortable Pain  Contact: [phone]  I'm still having those chest pains again, I take the Omeprazole 40 mg ,that helps for a little while,& also do you want me  to start back taking the Kflrbjvdrw60 mg or continue taking the 5 mg.?,but overall  the pain is very uncomfortable, wanted to know what should I do?

## 2023-10-24 ENCOUNTER — HOSPITAL ENCOUNTER (OUTPATIENT)
Facility: HOSPITAL | Age: 63
Discharge: HOME | End: 2023-10-27
Payer: COMMERCIAL

## 2023-10-24 PROCEDURE — 94726 PLETHYSMOGRAPHY LUNG VOLUMES: CPT

## 2023-10-24 PROCEDURE — 94729 DIFFUSING CAPACITY: CPT

## 2023-10-24 PROCEDURE — 94010 BREATHING CAPACITY TEST: CPT

## 2023-10-25 LAB
DLCO: NORMAL
FEV1/FVC: NORMAL
FEV1: NORMAL
FVC: NORMAL
TLC: NORMAL

## 2023-10-26 ENCOUNTER — TELEPHONE (OUTPATIENT)
Age: 63
End: 2023-10-26

## 2023-10-27 NOTE — TELEPHONE ENCOUNTER
Called and spoke to patient, name and date of birth verified. Patient given Dr. Essence Arnett message and voiced understanding. Patient states that she is still having some shortness of breath but wants to think about pulmonary consult and will rob us back if she wants to proceed.

## 2023-12-13 RX ORDER — AMLODIPINE BESYLATE 10 MG/1
10 TABLET ORAL DAILY
Qty: 90 TABLET | Refills: 0 | OUTPATIENT
Start: 2023-12-13

## 2024-01-05 DIAGNOSIS — R73.01 IFG (IMPAIRED FASTING GLUCOSE): ICD-10-CM

## 2024-01-22 ENCOUNTER — TELEPHONE (OUTPATIENT)
Age: 64
End: 2024-01-22

## 2024-01-22 ENCOUNTER — OFFICE VISIT (OUTPATIENT)
Age: 64
End: 2024-01-22
Payer: COMMERCIAL

## 2024-01-22 VITALS
WEIGHT: 237.2 LBS | DIASTOLIC BLOOD PRESSURE: 77 MMHG | HEIGHT: 65 IN | BODY MASS INDEX: 39.52 KG/M2 | HEART RATE: 76 BPM | TEMPERATURE: 98.2 F | SYSTOLIC BLOOD PRESSURE: 135 MMHG

## 2024-01-22 DIAGNOSIS — M25.473 ANKLE EDEMA: ICD-10-CM

## 2024-01-22 DIAGNOSIS — I87.9 VENOUS DISEASE: Primary | ICD-10-CM

## 2024-01-22 PROCEDURE — 99213 OFFICE O/P EST LOW 20 MIN: CPT | Performed by: INTERNAL MEDICINE

## 2024-01-22 PROCEDURE — 3078F DIAST BP <80 MM HG: CPT | Performed by: INTERNAL MEDICINE

## 2024-01-22 PROCEDURE — 3075F SYST BP GE 130 - 139MM HG: CPT | Performed by: INTERNAL MEDICINE

## 2024-01-22 RX ORDER — ALBUTEROL SULFATE 90 UG/1
2 AEROSOL, METERED RESPIRATORY (INHALATION) EVERY 6 HOURS PRN
Qty: 18 G | Refills: 5
Start: 2024-01-22

## 2024-01-22 ASSESSMENT — PATIENT HEALTH QUESTIONNAIRE - PHQ9
SUM OF ALL RESPONSES TO PHQ QUESTIONS 1-9: 0
1. LITTLE INTEREST OR PLEASURE IN DOING THINGS: 0
SUM OF ALL RESPONSES TO PHQ QUESTIONS 1-9: 0

## 2024-01-22 NOTE — PROGRESS NOTES
63 y.o. female who presents for evaluation.    She has known venous disease and has had surgery in the past by Dr Robertson although has not seen him in some years.  She was previously on lasix but currently on dyazide.  In th past, we had dec her amlo and added coreg due to persistent swelling.  She has been having significant swelling in the R>L calf the last few months which actually resulted in a skin break about a mo ago with some clear drainage.  No pnd, orthopnea, cp, pleurisy    Past Medical History:   Diagnosis Date    Allergic rhinitis     Anxiety     KIP-7 was 6/21    Chronic venous hypertension     12/14 [facility]    Colon polyp 06/2017    Dr Hubbard    COVID-19 virus infection     12/21; 4/22    Depression 1980s    zoloft    FHx: heart disease     H/O cardiovascular stress test     NST neg, EF 65% (1/14); NST low risk, ef 68%, tid 1/19 (7/23)    H/O pulmonary function tests 10/2023    FEV1 108, ratio 110, TLC 99, RV 97, Dsb 86    Hypertension     neg w/u secondary causes 3/15 Dr Gonzales    Hypothyroidism 07/05/2022    IFG (impaired fasting glucose) 07/2023    Morbid obesity (HCC)     peak weight 225 lbs, bmi 39.1 from 2/15; has seen nutritionist in past    SRINI (obstructive sleep apnea)     not using cpap    Subacromial bursitis     Vitamin D deficiency      Past Surgical History:   Procedure Laterality Date    CATARACT REMOVAL Bilateral 2021    COLONOSCOPY N/A     Dr Hubbard (7/3/12) neg; (6/21/17) polyp; Dr Hubbard (10/18/22) polyp    RAUL AND BSO (CERVIX REMOVED)      VASCULAR SURGERY  2017    left leg saphenous vein closure Dr Robertson     Social History     Socioeconomic History    Marital status:      Spouse name: Not on file    Number of children: 3    Years of education: Not on file    Highest education level: Not on file   Occupational History    Occupation: worked at [employer]   Tobacco Use    Smoking status: Never    Smokeless tobacco: Never   Substance and Sexual Activity    Alcohol use: No    Drug

## 2024-01-22 NOTE — PROGRESS NOTES
Raissa SINGH Edward presents today for   Chief Complaint   Patient presents with    Leg Swelling                 1. \"Have you been to the ER, urgent care clinic since your last visit?  Hospitalized since your last visit?\" no    2. \"Have you seen or consulted any other health care providers outside of the [facility] since your last visit?\" no     3. For patients aged 45-75: Has the patient had a colonoscopy / FIT/ Cologuard? No      If the patient is female:    4. For patients aged 40-74: Has the patient had a mammogram within the past 2 years? Yes - no Care Gap present      5. For patients aged 21-65: Has the patient had a pap smear? No

## 2024-01-22 NOTE — TELEPHONE ENCOUNTER
Pt has swelling in her ankles and her right leg. Painful and bruising. Skin has broken. Symptoms began a month ago. Pt is on blood pressure medication and is wondering if it has to do with that.   Pt needs a follow up visit with provider. Please advise when you would like to see pt in office.

## 2024-01-25 ENCOUNTER — TELEPHONE (OUTPATIENT)
Age: 64
End: 2024-01-25

## 2024-01-25 DIAGNOSIS — R60.0 LEG EDEMA, LEFT: Primary | ICD-10-CM

## 2024-01-25 NOTE — TELEPHONE ENCOUNTER
Pt was seen on Monday 01/22  was going to send a Rx for edema to her pharmacy pt was suppose to call  back today and let him know how meds are working. Pt said [facility] never received anything please advise   [phone]

## 2024-01-26 RX ORDER — FUROSEMIDE 20 MG/1
20 TABLET ORAL DAILY
Qty: 30 TABLET | Refills: 5 | Status: SHIPPED | OUTPATIENT
Start: 2024-01-26

## 2024-02-09 LAB
ANION GAP SERPL CALCULATED.3IONS-SCNC: 9 MMOL/L (ref 3–15)
BACTERIA: NEGATIVE
BILIRUB SERPL-MCNC: NEGATIVE MG/DL
BLOOD: NEGATIVE
BUN BLDV-MCNC: 12 MG/DL (ref 6–22)
CALCIUM SERPL-MCNC: 9.1 MG/DL (ref 8.4–10.5)
CHLORIDE BLD-SCNC: 105 MMOL/L (ref 98–110)
CLARITY: CLEAR
CO2: 28 MMOL/L (ref 20–32)
COLOR: YELLOW
CREAT SERPL-MCNC: 0.7 MG/DL (ref 0.8–1.4)
EPITHELIAL CELLS: ABNORMAL /HPF
ESTIMATED AVERAGE GLUCOSE: 117 MG/DL (ref 91–123)
GLOMERULAR FILTRATION RATE: >60 ML/MIN/1.73 SQ.M.
GLUCOSE: 115 MG/DL (ref 70–99)
GLUCOSE: NEGATIVE MG/DL
HBA1C MFR BLD: 5.7 % (ref 4.8–5.6)
HYALINE CASTS: ABNORMAL /LPF (ref 0–2)
KETONES, URINE: NEGATIVE MG/DL
LEUKOCYTE ESTERASE, URINE: NEGATIVE
NITRITE, URINE: NEGATIVE
PH, URINE: 6 PH (ref 5–8)
POTASSIUM SERPL-SCNC: 4.1 MMOL/L (ref 3.5–5.5)
PROTEIN UA: NEGATIVE MG/DL
RBC URINE: ABNORMAL /HPF
SODIUM BLD-SCNC: 142 MMOL/L (ref 133–145)
SPECIFIC GRAVITY: 1.02 (ref 1–1.03)
UROBILINOGEN: 0.2 MG/DL
WBC UA: ABNORMAL /HPF (ref 0–5)

## 2024-02-12 ENCOUNTER — OFFICE VISIT (OUTPATIENT)
Age: 64
End: 2024-02-12
Payer: COMMERCIAL

## 2024-02-12 VITALS
HEIGHT: 65 IN | WEIGHT: 239 LBS | DIASTOLIC BLOOD PRESSURE: 67 MMHG | BODY MASS INDEX: 39.82 KG/M2 | SYSTOLIC BLOOD PRESSURE: 104 MMHG | TEMPERATURE: 97.3 F | OXYGEN SATURATION: 96 % | HEART RATE: 82 BPM | RESPIRATION RATE: 16 BRPM

## 2024-02-12 DIAGNOSIS — R73.01 IFG (IMPAIRED FASTING GLUCOSE): ICD-10-CM

## 2024-02-12 DIAGNOSIS — E03.9 ACQUIRED HYPOTHYROIDISM: ICD-10-CM

## 2024-02-12 DIAGNOSIS — I10 ESSENTIAL HYPERTENSION: Primary | ICD-10-CM

## 2024-02-12 DIAGNOSIS — I83.93 ASYMPTOMATIC VARICOSE VEINS OF BOTH LOWER EXTREMITIES: ICD-10-CM

## 2024-02-12 DIAGNOSIS — Z12.31 SCREENING MAMMOGRAM FOR BREAST CANCER: ICD-10-CM

## 2024-02-12 DIAGNOSIS — E66.01 SEVERE OBESITY (BMI 35.0-39.9) WITH COMORBIDITY (HCC): ICD-10-CM

## 2024-02-12 PROCEDURE — 3078F DIAST BP <80 MM HG: CPT | Performed by: INTERNAL MEDICINE

## 2024-02-12 PROCEDURE — 99214 OFFICE O/P EST MOD 30 MIN: CPT | Performed by: INTERNAL MEDICINE

## 2024-02-12 PROCEDURE — 3074F SYST BP LT 130 MM HG: CPT | Performed by: INTERNAL MEDICINE

## 2024-02-12 RX ORDER — AMLODIPINE BESYLATE 2.5 MG/1
2.5 TABLET ORAL DAILY
Qty: 90 TABLET | Refills: 1 | Status: SHIPPED | OUTPATIENT
Start: 2024-02-12

## 2024-02-12 RX ORDER — POTASSIUM CHLORIDE 750 MG/1
10 TABLET, EXTENDED RELEASE ORAL DAILY
Qty: 30 TABLET | Refills: 5 | Status: SHIPPED | OUTPATIENT
Start: 2024-02-12

## 2024-02-12 RX ORDER — FUROSEMIDE 40 MG/1
40 TABLET ORAL DAILY
Qty: 30 TABLET | Refills: 5 | Status: SHIPPED | OUTPATIENT
Start: 2024-02-12

## 2024-02-12 NOTE — PROGRESS NOTES
63 y.o. female who presents for evaluation    After the last visit, we changed her to lasix from dyazide.  She has not noted much diff in the swelling.  Also, we referred her to Dr Robertson whom she had seen previously but she was never called?  Still wanting to see vascular. No cp, worsening sob.  She now reports that she was seen in [facility] lymphedema clinic a few years ago and was going for the  treatments but only for a few weeks?    Her bp remains controlled and sometimes low, she is interetsed in dec the amlo dosing    No gi or gu complaints.        No success with attempts at wt loss     Vitals 7/5/2022 1/8/2021 3/31/2020 1/7/2020 8/30/2019   Weight 232 lb 232 lb 221 lb 221 lb 214 lb      7/5/2023 7/18/2023 1/22/2024 2/12/2024   Vitals       Weight - Scale 243 lb  232 lb  237 lb 3.2 oz  239 lb      Past Medical History:   Diagnosis Date    Allergic rhinitis     Anxiety     KIP-7 was 6/21    Chronic venous hypertension     12/14 [facility]    Colon polyp 06/2017    Dr Hubbard    COVID-19 virus infection     12/21; 4/22    Depression 1980s    zoloft    FHx: heart disease     H/O cardiovascular stress test     NST neg, EF 65% (1/14); NST low risk, ef 68%, tid 1/19 (7/23)    H/O pulmonary function tests 10/2023    FEV1 108, ratio 110, TLC 99, RV 97, Dsb 86    Hypertension     neg w/u secondary causes 3/15 Dr Gonzales    Hypothyroidism 07/05/2022    IFG (impaired fasting glucose) 07/2023    Morbid obesity (HCC)     peak weight 225 lbs, bmi 39.1 from 2/15; has seen nutritionist in past    SRINI (obstructive sleep apnea)     not using cpap    Subacromial bursitis     Vitamin D deficiency      Past Surgical History:   Procedure Laterality Date    CATARACT REMOVAL Bilateral 2021    COLONOSCOPY N/A     Dr Hubbard (7/3/12) neg; (6/21/17) polyp; Dr Hubbard (10/18/22) polyp    RAUL AND BSO (CERVIX REMOVED)      VASCULAR SURGERY  2017    left leg saphenous vein closure Dr Robertson     Social History     Socioeconomic History    Marital

## 2024-02-12 NOTE — PROGRESS NOTES
Raissa Edward presents today for   Chief Complaint   Patient presents with    Follow-up     3-week       \"Have you been to the ER, urgent care clinic since your last visit?  Hospitalized since your last visit?\"    NO    “Have you seen or consulted any other health care providers outside of [facility] since your last visit?”    NO

## 2024-02-15 ENCOUNTER — TELEPHONE (OUTPATIENT)
Age: 64
End: 2024-02-15

## 2024-02-15 NOTE — TELEPHONE ENCOUNTER
----- Message from Xiomy Ramirez sent at 2/14/2024  4:12 PM EST -----  Subject: Message to Provider    QUESTIONS  Information for Provider? Patient was given a referral and does not want   to see the provider she was referred to. Please advise.   ---------------------------------------------------------------------------  --------------  CALL BACK INFO  [phone]; OK to leave message on voicemail  ---------------------------------------------------------------------------  --------------  SCRIPT ANSWERS  Relationship to Patient? Self

## 2024-02-15 NOTE — TELEPHONE ENCOUNTER
Called and spoke to the patient about referral. Patient states that she spoke to Dr. James about not going back to Dr. Robertson, asked where else she would like to be seen. Patient did not want to find a vascular surgery office and will go to the office the referral is sent to. Explained the the patient that even if the referral is sent to the same office Dr. Robertson is at that the patient has the right to request a different provider, if available.    Patient verbalized understanding. No further action required at this time.

## 2024-04-03 ENCOUNTER — OFFICE VISIT (OUTPATIENT)
Age: 64
End: 2024-04-03
Payer: COMMERCIAL

## 2024-04-03 VITALS
HEART RATE: 84 BPM | DIASTOLIC BLOOD PRESSURE: 92 MMHG | HEIGHT: 63 IN | SYSTOLIC BLOOD PRESSURE: 138 MMHG | BODY MASS INDEX: 41.46 KG/M2 | WEIGHT: 234 LBS | OXYGEN SATURATION: 95 %

## 2024-04-03 DIAGNOSIS — I83.812 VARICOSE VEINS OF LEFT LOWER EXTREMITY WITH PAIN: ICD-10-CM

## 2024-04-03 DIAGNOSIS — I89.0 LYMPHEDEMA: Primary | ICD-10-CM

## 2024-04-03 PROCEDURE — 99203 OFFICE O/P NEW LOW 30 MIN: CPT | Performed by: SURGERY

## 2024-04-03 PROCEDURE — 3080F DIAST BP >= 90 MM HG: CPT | Performed by: SURGERY

## 2024-04-03 PROCEDURE — 3075F SYST BP GE 130 - 139MM HG: CPT | Performed by: SURGERY

## 2024-04-03 NOTE — PROGRESS NOTES
chloride (KLOR-CON M) 10 MEQ extended release tablet Take 1 tablet by mouth daily 30 tablet 5    carvedilol (COREG) 25 MG tablet Take 1 tablet by mouth 2 times daily (with meals) 180 tablet 3    omeprazole (PRILOSEC) 40 MG delayed release capsule Take 1 capsule by mouth every morning (before breakfast) (Patient taking differently: Take 1 capsule by mouth as needed) 90 capsule 1    PREMARIN 0.625 MG tablet take 1 tablet by mouth once daily 90 tablet 3    vitamin D3 (CHOLECALCIFEROL) 125 MCG (5000 UT) TABS tablet Take by mouth      budesonide-formoterol (SYMBICORT) 160-4.5 MCG/ACT AERO Inhale 2 puffs into the lungs 2 times daily (Patient not taking: Reported on 2/12/2024) 10.2 g 5     No current facility-administered medications for this visit.     No Known Allergies  Social History     Socioeconomic History    Marital status:      Spouse name: Not on file    Number of children: 3    Years of education: Not on file    Highest education level: Not on file   Occupational History    Occupation: worked at [employer]   Tobacco Use    Smoking status: Never     Passive exposure: Never    Smokeless tobacco: Never   Substance and Sexual Activity    Alcohol use: No    Drug use: Never    Sexual activity: Not on file   Other Topics Concern    Not on file   Social History Narrative    Not on file     Social Determinants of Health     Financial Resource Strain: Low Risk  (7/5/2023)    Overall Financial Resource Strain (CARDIA)     Difficulty of Paying Living Expenses: Not hard at all   Food Insecurity: Not on file (7/5/2023)   Transportation Needs: Unknown (7/5/2023)    PRAPARE - Transportation     Lack of Transportation (Medical): Not on file     Lack of Transportation (Non-Medical): No   Physical Activity: Not on file   Stress: Not on file   Social Connections: Not on file   Intimate Partner Violence: Not on file   Housing Stability: Unknown (7/5/2023)    Housing Stability Vital Sign     Unable to Pay for Housing in the Last

## 2024-04-11 ENCOUNTER — TELEPHONE (OUTPATIENT)
Age: 64
End: 2024-04-11

## 2024-04-11 DIAGNOSIS — R05.9 COUGH, UNSPECIFIED TYPE: Primary | ICD-10-CM

## 2024-04-11 RX ORDER — BENZONATATE 200 MG/1
200 CAPSULE ORAL 3 TIMES DAILY PRN
Qty: 30 CAPSULE | Refills: 0 | Status: SHIPPED | OUTPATIENT
Start: 2024-04-11 | End: 2024-04-21

## 2024-04-11 NOTE — TELEPHONE ENCOUNTER
Pt has a persistent (choking) cough  started 1 week ago, she tested neg for covid , she said she has been taking musinex  , and recola   she is asking if  could send something into her pharmacy for her cough   [facility] [address]

## 2024-05-01 LAB
ANION GAP SERPL CALCULATED.3IONS-SCNC: 9 MMOL/L (ref 3–15)
BACTERIA: PRESENT
BILIRUB SERPL-MCNC: NEGATIVE MG/DL
BLOOD: NEGATIVE
BUN BLDV-MCNC: 11 MG/DL (ref 6–22)
CALCIUM SERPL-MCNC: 9 MG/DL (ref 8.4–10.5)
CHLORIDE BLD-SCNC: 105 MMOL/L (ref 98–110)
CLARITY: CLEAR
CO2: 29 MMOL/L (ref 20–32)
COLOR: YELLOW
CREAT SERPL-MCNC: 0.7 MG/DL (ref 0.8–1.4)
EPITHELIAL CELLS: ABNORMAL /HPF
GFR, ESTIMATED: >60 ML/MIN/1.73 SQ.M.
GLUCOSE: 99 MG/DL (ref 70–99)
GLUCOSE: NEGATIVE MG/DL
HYALINE CASTS: ABNORMAL /LPF (ref 0–2)
KETONES, URINE: ABNORMAL MG/DL
LEUKOCYTE ESTERASE, URINE: NEGATIVE
NITRITE, URINE: NEGATIVE
PH, URINE: 6 PH (ref 5–8)
POTASSIUM SERPL-SCNC: 4.3 MMOL/L (ref 3.5–5.5)
PROTEIN UA: NEGATIVE MG/DL
RBC URINE: ABNORMAL /HPF
SODIUM BLD-SCNC: 143 MMOL/L (ref 133–145)
SPECIFIC GRAVITY UA: 1.02 (ref 1–1.03)
UROBILINOGEN, URINE: 0.2 MG/DL
WBC UA: ABNORMAL /HPF (ref 0–5)

## 2024-05-03 NOTE — PROGRESS NOTES
Raissa Edward    Chief Complaint   Patient presents with    bilateral lower extremity lymphedema     Follow up after studies       History and Physical    Raissa Edward is a 64 y.o. female with PMH significant for GERD, hypertension, on amlodipine, hypothyroidism, morbid obesity with BMI greater than 41    she returns today for varicose veins.     Since her last visit:  - has developed some acid reflux, seeing her PCP next week  - new right knee pain which is particularly noticeable at night  - has been elevating legs steeply and wearing compression stockings daily  - has started walking more since her last visit      The most recent vascular imaging study was reviewed and discussed with the patient. This shows no evidence of DVT, superficial or deep venous insufficiency in either lower extremity.    Interpretation Summary         No evidence of deep or superficial vein thrombosis in the lower extremities bilaterally.    No evidence of deep or superficial venous insufficiency in the great or small saphenous veins bilaterally.    Multiphasic flow in the tibial arteries bilaterally.    Multiple lymph nodes visualized bilaterally in the groin, largest on the right measuring 3.35cm x 1.4cm trv and the left 1.7cm x 1.2cm trv.    Edema in the calves noted.     Procedure Details    A gray scale, color Doppler imaging and spectral Doppler analysis ultrasound was performed. During the study longitudinal and transverse views were obtained. Pulsed wave doppler was performed.     Lower Extremity Venous Findings    Right Lower Venous    No evidence of deep vein or superficial vein thrombosis. The common femoral, saphenofemoral junction, profunda femoral, femoral, popliteal, posterior tibial, peroneal, greater saphenous, and small saphenous veins were imaged in the transverse view and showed normal compressibility. The common femoral, popliteal, middle femoral, posterior tibial, and peroneal veins were imaged in the

## 2024-05-05 NOTE — PROGRESS NOTES
64 y.o. female who presents for evaluation    She is now seeing Dr Whitfield and was dx'ed with lymphedema, has the machine comning soon. We changed to lasix at the last visit and working better at keeping the swelling down    Her bp remains controlled on lower amlo dosing    No gi or gu complaints.        No success with attempts at wt loss      7/5/2023 7/18/2023 1/22/2024 2/12/2024 4/3/2024 5/8/2024   Vitals         Weight - Scale 243 lb  232 lb  237 lb 3.2 oz  239 lb  234 lb  234 lb       5/15/2024   Vitals    Weight - Scale 241 lb      Past Medical History:   Diagnosis Date    Allergic rhinitis     Anxiety     KIP-7 was 6/21    Chronic venous hypertension     12/14 [facility]    Colon polyp 06/2017    Dr Hubbard    COVID-19 virus infection     12/21; 4/22    Depression 1980s    zoloft    FHx: heart disease     H/O cardiovascular stress test     NST neg, EF 65% (1/14); NST low risk, ef 68%, tid 1/19 (7/23)    H/O pulmonary function tests 10/2023    FEV1 108, ratio 110, TLC 99, RV 97, Dsb 86    Hypertension     neg w/u secondary causes 3/15 Dr Gonzales    Hypothyroidism 07/05/2022    IFG (impaired fasting glucose) 07/2023    Morbid obesity (HCC)     peak weight 225 lbs, bmi 39.1 from 2/15; has seen nutritionist in past    SRINI (obstructive sleep apnea)     not using cpap    Subacromial bursitis     Vitamin D deficiency      Past Surgical History:   Procedure Laterality Date    CATARACT REMOVAL Bilateral 2021    COLONOSCOPY N/A     Dr Hubbard (7/3/12) neg; (6/21/17) polyp; Dr Hubbard (10/18/22) polyp    RAUL AND BSO (CERVIX REMOVED)      VASCULAR SURGERY  2017    left leg saphenous vein closure Dr Robertson     Social History     Socioeconomic History    Marital status:      Spouse name: Not on file    Number of children: 3    Years of education: Not on file    Highest education level: Not on file   Occupational History    Occupation: worked at [employer]   Tobacco Use    Smoking status: Never     Passive exposure: Never

## 2024-05-08 ENCOUNTER — OFFICE VISIT (OUTPATIENT)
Age: 64
End: 2024-05-08
Payer: COMMERCIAL

## 2024-05-08 VITALS
SYSTOLIC BLOOD PRESSURE: 118 MMHG | DIASTOLIC BLOOD PRESSURE: 80 MMHG | HEART RATE: 84 BPM | WEIGHT: 234 LBS | HEIGHT: 63 IN | OXYGEN SATURATION: 96 % | BODY MASS INDEX: 41.46 KG/M2

## 2024-05-08 DIAGNOSIS — I89.0 LYMPHEDEMA: Primary | ICD-10-CM

## 2024-05-08 PROCEDURE — 3079F DIAST BP 80-89 MM HG: CPT | Performed by: SURGERY

## 2024-05-08 PROCEDURE — 99213 OFFICE O/P EST LOW 20 MIN: CPT | Performed by: SURGERY

## 2024-05-08 PROCEDURE — 3074F SYST BP LT 130 MM HG: CPT | Performed by: SURGERY

## 2024-05-08 NOTE — PATIENT INSTRUCTIONS
You will be contacted by reps from [employer] to set up your lymphedema pump. If you do not hear from them in a couple weeks, please call the office

## 2024-05-15 ENCOUNTER — OFFICE VISIT (OUTPATIENT)
Age: 64
End: 2024-05-15
Payer: COMMERCIAL

## 2024-05-15 VITALS
SYSTOLIC BLOOD PRESSURE: 123 MMHG | BODY MASS INDEX: 42.7 KG/M2 | HEIGHT: 63 IN | DIASTOLIC BLOOD PRESSURE: 74 MMHG | TEMPERATURE: 98.2 F | RESPIRATION RATE: 16 BRPM | WEIGHT: 241 LBS | HEART RATE: 76 BPM | OXYGEN SATURATION: 96 %

## 2024-05-15 DIAGNOSIS — I10 ESSENTIAL HYPERTENSION: ICD-10-CM

## 2024-05-15 DIAGNOSIS — E03.9 ACQUIRED HYPOTHYROIDISM: ICD-10-CM

## 2024-05-15 DIAGNOSIS — E66.01 SEVERE OBESITY (BMI 35.0-39.9) WITH COMORBIDITY (HCC): ICD-10-CM

## 2024-05-15 DIAGNOSIS — I89.0 LYMPHEDEMA: Primary | ICD-10-CM

## 2024-05-15 PROCEDURE — 3074F SYST BP LT 130 MM HG: CPT | Performed by: INTERNAL MEDICINE

## 2024-05-15 PROCEDURE — 3078F DIAST BP <80 MM HG: CPT | Performed by: INTERNAL MEDICINE

## 2024-05-15 PROCEDURE — 99214 OFFICE O/P EST MOD 30 MIN: CPT | Performed by: INTERNAL MEDICINE

## 2024-05-15 NOTE — PROGRESS NOTES
Raissa Edward presents today for   Chief Complaint   Patient presents with    Follow-up     3 month    Hypertension       \"Have you been to the ER, urgent care clinic since your last visit?  Hospitalized since your last visit?\"    NO    “Have you seen or consulted any other health care providers outside of [facility] since your last visit?”    NO

## 2024-05-28 ENCOUNTER — TELEPHONE (OUTPATIENT)
Age: 64
End: 2024-05-28

## 2024-05-28 NOTE — TELEPHONE ENCOUNTER
----- Message from Naif Goldberg sent at 5/28/2024 10:59 AM EDT -----  Regarding: [employer] appt request  Pt stated that [employer] has not come out yet or contacted her to make an appt to bring a machine for pt.

## 2024-05-28 NOTE — TELEPHONE ENCOUNTER
Got reply from [employer], pt was contacted and notes refaxed. Pt's account on hold due to non payment from previous pump. Ankita has given pt number for accounting to clear up issue and then they will schedule a visit .

## 2024-05-28 NOTE — TELEPHONE ENCOUNTER
Called [employer] and left message regarding referral . Called pt and advised that I would let her know one way or the other what is going on.

## 2024-06-12 RX ORDER — CONJUGATED ESTROGENS 0.62 MG/1
TABLET, FILM COATED ORAL
Qty: 90 TABLET | Refills: 3 | Status: SHIPPED | OUTPATIENT
Start: 2024-06-12

## 2024-07-15 ENCOUNTER — NURSE ONLY (OUTPATIENT)
Age: 64
End: 2024-07-15

## 2024-07-31 DIAGNOSIS — I10 ESSENTIAL HYPERTENSION: ICD-10-CM

## 2024-07-31 RX ORDER — AMLODIPINE BESYLATE 2.5 MG/1
2.5 TABLET ORAL DAILY
Qty: 90 TABLET | Refills: 3 | Status: SHIPPED | OUTPATIENT
Start: 2024-07-31

## 2024-10-21 RX ORDER — CARVEDILOL 25 MG/1
25 TABLET ORAL 2 TIMES DAILY WITH MEALS
Qty: 180 TABLET | Refills: 3 | Status: SHIPPED | OUTPATIENT
Start: 2024-10-21

## 2024-10-31 ENCOUNTER — OFFICE VISIT (OUTPATIENT)
Age: 64
End: 2024-10-31
Payer: COMMERCIAL

## 2024-10-31 VITALS
HEART RATE: 72 BPM | OXYGEN SATURATION: 96 % | HEIGHT: 62 IN | BODY MASS INDEX: 44.16 KG/M2 | WEIGHT: 240 LBS | SYSTOLIC BLOOD PRESSURE: 120 MMHG | DIASTOLIC BLOOD PRESSURE: 60 MMHG

## 2024-10-31 DIAGNOSIS — R10.31 SEVERE RIGHT GROIN PAIN: Primary | ICD-10-CM

## 2024-10-31 PROCEDURE — 99213 OFFICE O/P EST LOW 20 MIN: CPT | Performed by: SURGERY

## 2024-10-31 PROCEDURE — 3074F SYST BP LT 130 MM HG: CPT | Performed by: SURGERY

## 2024-10-31 PROCEDURE — 3078F DIAST BP <80 MM HG: CPT | Performed by: SURGERY

## 2024-10-31 NOTE — PROGRESS NOTES
Activity level    Smoking status: never smoker        The following labs were reviewed:   Lab Results   Component Value Date     04/30/2024    K 4.3 04/30/2024     04/30/2024    CO2 29 04/30/2024    BUN 11 04/30/2024    CREATININE 0.7 (L) 04/30/2024    GLUCOSE 99 04/30/2024    GLUCOSE Negative 04/30/2024    CALCIUM 9.0 04/30/2024    BILITOT Negative 04/30/2024    ALKPHOS 114 07/26/2023    AST 20 07/26/2023    ALT 17 07/26/2023    LABGLOM >60.0 04/30/2024    GFRAA >60.0 12/31/2020    AGRATIO 1.5 07/26/2023    GLOB 2.9 07/26/2023       Lab Results   Component Value Date    WBC 5.1 07/26/2023    HGB 13.4 07/26/2023    HCT 40.6 07/26/2023    MCV 96 07/26/2023     07/26/2023       Hemoglobin A1C   Date Value Ref Range Status   02/08/2024 5.7 (H) 4.8 - 5.6 % Final         Physical Exam:    /60   Pulse 72   Ht 1.575 m (5' 2\")   Wt 108.9 kg (240 lb)   LMP  (LMP Unknown)   SpO2 96%   BMI 43.90 kg/m²      Comprehensive physical exam deferred.  Focused exam with bilateral lower extremity 1+ pitting and nonpitting edema, extending to the knees.  Hyperpigmentation and lipodermatosclerosis appear improved compared to last visit.  Findings below from previous visit.     Constitutional:  Patient is well developed, well nourished, and not distressed.   Cardiovascular:  Normal rate, regular rhythm  Pulses:  Right:      Dorsalis      2+    Post Tib      2+ Left:        Dorsalis      2+    Post Tib      2+     Pulmonary/Chest: Effort normal    Extremities: Normal range of motion. BLE 2+ pitting and non-pitting edema, extending to the knees.  Severe bilateral hyperpigmentation in the gaiter area, severe lipodermatosclerosis. Varicose veins are not present, no corona phlebectatica. Digits no cyanosis or clubbing  Neurological:  she  is alert and oriented x3 . Gait normal.   Psych: Appropriate mood and affect.   Skin:  Skin is warm and dry. No rash noted. No erythema. No ulcers.        Impression and

## 2024-11-01 ENCOUNTER — CLINICAL DOCUMENTATION (OUTPATIENT)
Age: 64
End: 2024-11-01

## 2024-11-01 ENCOUNTER — TELEPHONE (OUTPATIENT)
Age: 64
End: 2024-11-01

## 2024-11-01 NOTE — PROGRESS NOTES
Called Chris on 11.01.24 prior authorization at [phone] to check if CPT code 72172 needs authorization. Per representative this code does not need authorization. Reference [id number]. Patient is scheduled 11/04/2024 for a duplex ultrasound order by Dr. Whitfield.

## 2024-11-01 NOTE — TELEPHONE ENCOUNTER
Pt cancelled appt for 11/04 she stated that she does not want the appt right now she is going to wait it out. She will call if anything changes.

## 2024-11-12 ENCOUNTER — HOSPITAL ENCOUNTER (OUTPATIENT)
Facility: HOSPITAL | Age: 64
Setting detail: SPECIMEN
Discharge: HOME | End: 2024-11-15

## 2024-11-12 LAB — SENTARA SPECIMEN COLLECTION: NORMAL

## 2024-11-12 PROCEDURE — 99001 SPECIMEN HANDLING PT-LAB: CPT

## 2024-11-13 LAB
A/G RATIO: 1.6 RATIO (ref 1.1–2.6)
ALBUMIN: 4.3 G/DL (ref 3.5–5)
ALP BLD-CCNC: 107 U/L (ref 40–120)
ALT SERPL-CCNC: 15 U/L (ref 5–40)
ANION GAP SERPL CALCULATED.3IONS-SCNC: 14 MMOL/L (ref 3–15)
AST SERPL-CCNC: 21 U/L (ref 10–37)
BASOPHILS ABSOLUTE: 0 K/UL (ref 0–0.2)
BASOPHILS RELATIVE PERCENT: 1 % (ref 0–2)
BILIRUB SERPL-MCNC: 0.5 MG/DL (ref 0.2–1.2)
BUN BLDV-MCNC: 13 MG/DL (ref 6–22)
CALCIUM SERPL-MCNC: 8.6 MG/DL (ref 8.4–10.5)
CHLORIDE BLD-SCNC: 100 MMOL/L (ref 98–110)
CHOLESTEROL, TOTAL: 177 MG/DL (ref 110–200)
CHOLESTEROL/HDL RATIO: 2.6 (ref 0–5)
CO2: 23 MMOL/L (ref 20–32)
CREAT SERPL-MCNC: 0.9 MG/DL (ref 0.8–1.4)
EOSINOPHIL # BLD: 2 % (ref 0–6)
EOSINOPHILS ABSOLUTE: 0.1 K/UL (ref 0–0.5)
ESTIMATED AVERAGE GLUCOSE: 122 MG/DL (ref 91–123)
GFR, ESTIMATED: >60 ML/MIN/1.73 SQ.M.
GLOBULIN: 2.7 G/DL (ref 2–4)
GLUCOSE: 110 MG/DL (ref 70–99)
HBA1C MFR BLD: 5.9 % (ref 4.8–5.6)
HCT VFR BLD CALC: 41.4 % (ref 35.1–48)
HDLC SERPL-MCNC: 68 MG/DL
HEMOGLOBIN: 13.3 G/DL (ref 11.7–16)
LDL CHOLESTEROL: 98 MG/DL (ref 50–99)
LDL/HDL RATIO: 1.4
LYMPHOCYTES # BLD: 56 % (ref 20–45)
LYMPHOCYTES ABSOLUTE: 2.8 K/UL (ref 1–4.8)
MCH RBC QN AUTO: 32 PG (ref 26–34)
MCHC RBC AUTO-ENTMCNC: 32 G/DL (ref 31–36)
MCV RBC AUTO: 98 FL (ref 80–99)
MONOCYTES ABSOLUTE: 0.5 K/UL (ref 0.1–1)
MONOCYTES: 10 % (ref 3–12)
NEUTROPHILS ABSOLUTE: 1.6 K/UL (ref 1.8–7.7)
NEUTROPHILS: 32 % (ref 40–75)
NON-HDL CHOLESTEROL: 109 MG/DL
PDW BLD-RTO: 13.4 % (ref 10–15.5)
PLATELET # BLD: 182 K/UL (ref 140–440)
PMV BLD AUTO: 11.2 FL (ref 9–13)
POTASSIUM SERPL-SCNC: 3.8 MMOL/L (ref 3.5–5.5)
RBC # BLD: 4.22 M/UL (ref 3.8–5.2)
SODIUM BLD-SCNC: 137 MMOL/L (ref 133–145)
T4 FREE: 1.2 NG/DL (ref 0.9–1.8)
TOTAL PROTEIN: 7 G/DL (ref 6.2–8.1)
TRIGL SERPL-MCNC: 54 MG/DL (ref 40–149)
TSH SERPL DL<=0.05 MIU/L-ACNC: 1.82 MCU/ML (ref 0.27–4.2)
VLDLC SERPL CALC-MCNC: 11 MG/DL (ref 8–30)
WBC # BLD: 4.9 K/UL (ref 4–11)

## 2024-11-15 NOTE — PROGRESS NOTES
64 y.o. female who presents for evaluation    She is now seeing Dr Whitfield and was dx'ed with lymphedema, using the machine just about daily with good control     Her bp remains controlled.  No set exercise. Denied cp, sob, pnd    No gi or gu complaints.        No success with attempts at wt loss and she is open to hearing about wt loss options     7/5/2023 7/18/2023 1/22/2024 2/12/2024 4/3/2024 5/8/2024   Vitals         Weight - Scale 243 lb  232 lb  237 lb 3.2 oz  239 lb  234 lb  234 lb       10/31/2024 11/19/2024   Vitals     Weight - Scale 240 lb  244 lb      Past Medical History:   Diagnosis Date    Allergic rhinitis     Anxiety     KIP-7 was 6/21    Chronic venous hypertension     12/14 [facility]    Colon polyp 06/2017    Dr Hubbard    COVID-19 virus infection     12/21; 4/22    Depression 1980s    zoloft    FHx: heart disease     H/O cardiovascular stress test     NST neg, EF 65% (1/14); NST low risk, ef 68%, tid 1/19 (7/23)    H/O pulmonary function tests 10/2023    FEV1 108, ratio 110, TLC 99, RV 97, Dsb 86    Hypertension     neg w/u secondary causes 3/15 Dr Gonzales    Hypothyroidism 07/05/2022    IFG (impaired fasting glucose) 07/2023    Morbid obesity     peak weight 225 lbs, bmi 39.1 from 2/15; has seen nutritionist in past    SRINI (obstructive sleep apnea)     not using cpap    Subacromial bursitis     Vitamin D deficiency      Past Surgical History:   Procedure Laterality Date    CATARACT REMOVAL Bilateral 2021    COLONOSCOPY N/A     Dr Hubbard (7/3/12) neg; (6/21/17) polyp; Dr Hubbard (10/18/22) polyp    RAUL AND BSO (CERVIX REMOVED)      VASCULAR SURGERY  2017    left leg saphenous vein closure Dr Robertson     Social History     Socioeconomic History    Marital status:      Spouse name: Not on file    Number of children: 3    Years of education: Not on file    Highest education level: Not on file   Occupational History    Occupation: worked at [employer]   Tobacco Use    Smoking status: Never     Passive

## 2024-11-16 SDOH — ECONOMIC STABILITY: INCOME INSECURITY: HOW HARD IS IT FOR YOU TO PAY FOR THE VERY BASICS LIKE FOOD, HOUSING, MEDICAL CARE, AND HEATING?: NOT HARD AT ALL

## 2024-11-16 SDOH — ECONOMIC STABILITY: FOOD INSECURITY: WITHIN THE PAST 12 MONTHS, YOU WORRIED THAT YOUR FOOD WOULD RUN OUT BEFORE YOU GOT MONEY TO BUY MORE.: NEVER TRUE

## 2024-11-16 SDOH — ECONOMIC STABILITY: TRANSPORTATION INSECURITY
IN THE PAST 12 MONTHS, HAS LACK OF TRANSPORTATION KEPT YOU FROM MEETINGS, WORK, OR FROM GETTING THINGS NEEDED FOR DAILY LIVING?: NO

## 2024-11-16 SDOH — ECONOMIC STABILITY: FOOD INSECURITY: WITHIN THE PAST 12 MONTHS, THE FOOD YOU BOUGHT JUST DIDN'T LAST AND YOU DIDN'T HAVE MONEY TO GET MORE.: NEVER TRUE

## 2024-11-19 ENCOUNTER — OFFICE VISIT (OUTPATIENT)
Facility: CLINIC | Age: 64
End: 2024-11-19
Payer: COMMERCIAL

## 2024-11-19 VITALS
TEMPERATURE: 98.2 F | OXYGEN SATURATION: 97 % | HEART RATE: 72 BPM | HEIGHT: 62 IN | BODY MASS INDEX: 44.9 KG/M2 | SYSTOLIC BLOOD PRESSURE: 137 MMHG | RESPIRATION RATE: 16 BRPM | DIASTOLIC BLOOD PRESSURE: 85 MMHG | WEIGHT: 244 LBS

## 2024-11-19 DIAGNOSIS — E03.9 ACQUIRED HYPOTHYROIDISM: ICD-10-CM

## 2024-11-19 DIAGNOSIS — I89.0 LYMPHEDEMA: ICD-10-CM

## 2024-11-19 DIAGNOSIS — E66.01 SEVERE OBESITY (BMI 35.0-39.9) WITH COMORBIDITY: Primary | ICD-10-CM

## 2024-11-19 DIAGNOSIS — R73.01 IFG (IMPAIRED FASTING GLUCOSE): ICD-10-CM

## 2024-11-19 DIAGNOSIS — I10 ESSENTIAL HYPERTENSION: ICD-10-CM

## 2024-11-19 PROCEDURE — 3075F SYST BP GE 130 - 139MM HG: CPT | Performed by: INTERNAL MEDICINE

## 2024-11-19 PROCEDURE — 99214 OFFICE O/P EST MOD 30 MIN: CPT | Performed by: INTERNAL MEDICINE

## 2024-11-19 PROCEDURE — 3079F DIAST BP 80-89 MM HG: CPT | Performed by: INTERNAL MEDICINE

## 2024-11-19 NOTE — PROGRESS NOTES
Raissa Edward presents today for   Chief Complaint   Patient presents with    6 Month Follow-Up       \"Have you been to the ER, urgent care clinic since your last visit?  Hospitalized since your last visit?\"    NO    “Have you seen or consulted any other health care providers outside of [facility] since your last visit?”    NO       Have you had a mammogram?”   NO    Date of last Mammogram: 8/16/2022

## 2024-11-20 ENCOUNTER — TELEPHONE (OUTPATIENT)
Facility: CLINIC | Age: 64
End: 2024-11-20

## 2024-11-20 NOTE — TELEPHONE ENCOUNTER
Patient was in to see provider yesterday on 11/19/2024. Patient reports pain in groin area of her right thigh. It is hurting and hard to walk. Forgot to mention when she was just in the office due to it not really bothering her at the time. Please advise.

## 2024-11-20 NOTE — TELEPHONE ENCOUNTER
Can't dx without exam  Either sched here or go to urgent care - would probably do better with the latter as they can get xray if needed

## 2024-11-22 NOTE — TELEPHONE ENCOUNTER
Called and spoke to patient, relayed message from Dr. James; patient verbalized understanding.    No further action required.

## 2025-03-16 SDOH — ECONOMIC STABILITY: FOOD INSECURITY: WITHIN THE PAST 12 MONTHS, YOU WORRIED THAT YOUR FOOD WOULD RUN OUT BEFORE YOU GOT MONEY TO BUY MORE.: NEVER TRUE

## 2025-03-16 SDOH — ECONOMIC STABILITY: INCOME INSECURITY: IN THE LAST 12 MONTHS, WAS THERE A TIME WHEN YOU WERE NOT ABLE TO PAY THE MORTGAGE OR RENT ON TIME?: NO

## 2025-03-16 SDOH — ECONOMIC STABILITY: TRANSPORTATION INSECURITY
IN THE PAST 12 MONTHS, HAS THE LACK OF TRANSPORTATION KEPT YOU FROM MEDICAL APPOINTMENTS OR FROM GETTING MEDICATIONS?: NO

## 2025-03-16 SDOH — ECONOMIC STABILITY: FOOD INSECURITY: WITHIN THE PAST 12 MONTHS, THE FOOD YOU BOUGHT JUST DIDN'T LAST AND YOU DIDN'T HAVE MONEY TO GET MORE.: NEVER TRUE

## 2025-03-19 ENCOUNTER — OFFICE VISIT (OUTPATIENT)
Facility: CLINIC | Age: 65
End: 2025-03-19
Payer: COMMERCIAL

## 2025-03-19 VITALS
SYSTOLIC BLOOD PRESSURE: 131 MMHG | HEART RATE: 68 BPM | WEIGHT: 246 LBS | DIASTOLIC BLOOD PRESSURE: 89 MMHG | TEMPERATURE: 98.1 F | BODY MASS INDEX: 45.27 KG/M2 | RESPIRATION RATE: 16 BRPM | OXYGEN SATURATION: 96 % | HEIGHT: 62 IN

## 2025-03-19 DIAGNOSIS — I83.93 ASYMPTOMATIC VARICOSE VEINS OF BOTH LOWER EXTREMITIES: ICD-10-CM

## 2025-03-19 DIAGNOSIS — E04.9 GOITER: Primary | ICD-10-CM

## 2025-03-19 DIAGNOSIS — N95.9 MENOPAUSAL AND PERIMENOPAUSAL DISORDER: ICD-10-CM

## 2025-03-19 DIAGNOSIS — R07.89 ATYPICAL CHEST PAIN: ICD-10-CM

## 2025-03-19 DIAGNOSIS — Z12.31 SCREENING MAMMOGRAM FOR BREAST CANCER: ICD-10-CM

## 2025-03-19 DIAGNOSIS — K21.9 GASTROESOPHAGEAL REFLUX DISEASE, UNSPECIFIED WHETHER ESOPHAGITIS PRESENT: ICD-10-CM

## 2025-03-19 PROCEDURE — 3079F DIAST BP 80-89 MM HG: CPT | Performed by: INTERNAL MEDICINE

## 2025-03-19 PROCEDURE — 3075F SYST BP GE 130 - 139MM HG: CPT | Performed by: INTERNAL MEDICINE

## 2025-03-19 PROCEDURE — 99397 PER PM REEVAL EST PAT 65+ YR: CPT | Performed by: INTERNAL MEDICINE

## 2025-03-19 ASSESSMENT — PATIENT HEALTH QUESTIONNAIRE - PHQ9
SUM OF ALL RESPONSES TO PHQ QUESTIONS 1-9: 0
2. FEELING DOWN, DEPRESSED OR HOPELESS: NOT AT ALL
1. LITTLE INTEREST OR PLEASURE IN DOING THINGS: NOT AT ALL

## 2025-03-19 NOTE — PROGRESS NOTES
Raissa Edward presents today for   Chief Complaint   Patient presents with    4 Month Follow-Up    Hypertension       \"Have you been to the ER, urgent care clinic since your last visit?  Hospitalized since your last visit?\"    NO    “Have you seen or consulted any other health care providers outside of [facility] since your last visit?”    NO       Have you had a mammogram?”   NO    Date of last Mammogram: 8/16/2022           
  Occupational History    Occupation: worked at [employer]   Tobacco Use    Smoking status: Never     Passive exposure: Never    Smokeless tobacco: Never   Substance and Sexual Activity    Alcohol use: No    Drug use: Never    Sexual activity: Not on file   Other Topics Concern    Not on file   Social History Narrative    Not on file     Social Drivers of Health     Financial Resource Strain: Low Risk  (11/16/2024)    Overall Financial Resource Strain (CARDIA)     Difficulty of Paying Living Expenses: Not hard at all   Food Insecurity: No Food Insecurity (3/16/2025)    Hunger Vital Sign     Worried About Running Out of Food in the Last Year: Never true     Ran Out of Food in the Last Year: Never true   Transportation Needs: No Transportation Needs (3/16/2025)    PRAPARE - Transportation     Lack of Transportation (Medical): No     Lack of Transportation (Non-Medical): No   Physical Activity: Not on file   Stress: Not on file   Social Connections: Not on file   Intimate Partner Violence: Not on file   Housing Stability: Low Risk  (3/16/2025)    Housing Stability Vital Sign     Unable to Pay for Housing in the Last Year: No     Number of Times Moved in the Last Year: 0     Homeless in the Last Year: No     Current Outpatient Medications   Medication Sig    furosemide (LASIX) 40 MG tablet Take 1 tablet by mouth daily    omeprazole (PRILOSEC) 40 MG delayed release capsule Take 1 capsule by mouth daily    carvedilol (COREG) 25 MG tablet take 1 tablet by mouth twice a day with meals    amLODIPine (NORVASC) 2.5 MG tablet take 1 tablet by mouth once daily    PREMARIN 0.625 MG tablet take 1 tablet by mouth once daily    potassium chloride (KLOR-CON M) 10 MEQ extended release tablet Take 1 tablet by mouth daily    vitamin D3 (CHOLECALCIFEROL) 125 MCG (5000 UT) TABS tablet Take by mouth     No current facility-administered medications for this visit.     No Known Allergies    REVIEW OF SYSTEMS: mammo 8/22, [place] 10/22

## 2025-03-20 RX ORDER — OMEPRAZOLE 40 MG/1
40 CAPSULE, DELAYED RELEASE ORAL DAILY
Qty: 90 CAPSULE | Refills: 3 | Status: SHIPPED | OUTPATIENT
Start: 2025-03-20

## 2025-03-20 RX ORDER — FUROSEMIDE 40 MG/1
40 TABLET ORAL DAILY
Qty: 90 TABLET | Refills: 3 | Status: SHIPPED | OUTPATIENT
Start: 2025-03-20

## 2025-03-26 ENCOUNTER — HOSPITAL ENCOUNTER (OUTPATIENT)
Facility: HOSPITAL | Age: 65
Setting detail: SPECIMEN
Discharge: HOME | End: 2025-03-29

## 2025-03-26 ENCOUNTER — LAB (OUTPATIENT)
Facility: CLINIC | Age: 65
End: 2025-03-26

## 2025-03-26 DIAGNOSIS — E04.9 GOITER: ICD-10-CM

## 2025-03-26 LAB — SENTARA SPECIMEN COLLECTION: NORMAL

## 2025-03-26 PROCEDURE — 99001 SPECIMEN HANDLING PT-LAB: CPT

## 2025-03-27 LAB
T4 FREE: 1.1 NG/DL (ref 0.9–1.8)
TSH SERPL DL<=0.05 MIU/L-ACNC: 0.84 MCU/ML (ref 0.27–4.2)

## 2025-05-19 DIAGNOSIS — R73.01 IFG (IMPAIRED FASTING GLUCOSE): ICD-10-CM

## 2025-05-20 ENCOUNTER — HOSPITAL ENCOUNTER (OUTPATIENT)
Facility: HOSPITAL | Age: 65
Discharge: HOME | End: 2025-05-23
Attending: INTERNAL MEDICINE
Payer: COMMERCIAL

## 2025-05-20 DIAGNOSIS — N95.9 MENOPAUSAL AND PERIMENOPAUSAL DISORDER: ICD-10-CM

## 2025-05-20 DIAGNOSIS — Z12.31 SCREENING MAMMOGRAM FOR BREAST CANCER: ICD-10-CM

## 2025-05-20 DIAGNOSIS — E04.9 GOITER: ICD-10-CM

## 2025-05-20 PROCEDURE — 77080 DXA BONE DENSITY AXIAL: CPT

## 2025-05-20 PROCEDURE — 77063 BREAST TOMOSYNTHESIS BI: CPT

## 2025-05-20 PROCEDURE — 76536 US EXAM OF HEAD AND NECK: CPT

## 2025-05-21 ENCOUNTER — RESULTS FOLLOW-UP (OUTPATIENT)
Facility: CLINIC | Age: 65
End: 2025-05-21

## 2025-05-21 NOTE — TELEPHONE ENCOUNTER
pls call    DEXA shows osteopenia but not qualifying for tx  Calcium 1000, vit d [id number], weight bearing exercise and recheck in 2-3 years

## 2025-06-06 RX ORDER — CONJUGATED ESTROGENS 0.62 MG/1
0.62 TABLET, FILM COATED ORAL DAILY
Qty: 90 TABLET | Refills: 3 | Status: SHIPPED | OUTPATIENT
Start: 2025-06-06

## 2025-08-13 DIAGNOSIS — I10 ESSENTIAL HYPERTENSION: ICD-10-CM

## 2025-08-13 RX ORDER — AMLODIPINE BESYLATE 2.5 MG/1
2.5 TABLET ORAL DAILY
Qty: 90 TABLET | Refills: 0 | Status: SHIPPED | OUTPATIENT
Start: 2025-08-13

## 2025-09-03 ENCOUNTER — OFFICE VISIT (OUTPATIENT)
Facility: CLINIC | Age: 65
End: 2025-09-03
Payer: COMMERCIAL

## 2025-09-03 VITALS
RESPIRATION RATE: 16 BRPM | BODY MASS INDEX: 42.7 KG/M2 | WEIGHT: 241 LBS | DIASTOLIC BLOOD PRESSURE: 95 MMHG | HEIGHT: 63 IN | HEART RATE: 105 BPM | SYSTOLIC BLOOD PRESSURE: 152 MMHG | TEMPERATURE: 98.4 F

## 2025-09-03 DIAGNOSIS — U07.1 COVID-19 VIRUS INFECTION: ICD-10-CM

## 2025-09-03 DIAGNOSIS — R05.9 COUGH, UNSPECIFIED TYPE: Primary | ICD-10-CM

## 2025-09-03 LAB
EXP DATE SOLUTION: NORMAL
EXP DATE SWAB: NORMAL
EXPIRATION DATE: NORMAL
GROUP A STREP ANTIGEN, POC: NEGATIVE
INFLUENZA A RNA, POC: NORMAL
INFLUENZA B RNA, POC: NORMAL
LOT NUMBER POC: NORMAL
LOT NUMBER SOLUTION: NORMAL
LOT NUMBER SWAB: NORMAL
SARS-COV-2 RNA, POC: POSITIVE
VALID INTERNAL CONTROL, POC: YES
VALID INTERNAL CONTROL: NORMAL

## 2025-09-03 PROCEDURE — 99213 OFFICE O/P EST LOW 20 MIN: CPT | Performed by: INTERNAL MEDICINE

## 2025-09-03 PROCEDURE — 3079F DIAST BP 80-89 MM HG: CPT | Performed by: INTERNAL MEDICINE

## 2025-09-03 PROCEDURE — 87880 STREP A ASSAY W/OPTIC: CPT | Performed by: INTERNAL MEDICINE

## 2025-09-03 PROCEDURE — 87636 SARSCOV2 & INF A&B AMP PRB: CPT | Performed by: INTERNAL MEDICINE

## 2025-09-03 PROCEDURE — 3077F SYST BP >= 140 MM HG: CPT | Performed by: INTERNAL MEDICINE

## 2025-09-03 PROCEDURE — 1123F ACP DISCUSS/DSCN MKR DOCD: CPT | Performed by: INTERNAL MEDICINE

## 2025-09-03 RX ORDER — HYDROCODONE POLISTIREX AND CHLORPHENIRAMINE POLISTIREX 10; 8 MG/5ML; MG/5ML
5 SUSPENSION, EXTENDED RELEASE ORAL EVERY 12 HOURS PRN
Qty: 100 ML | Refills: 0 | Status: SHIPPED | OUTPATIENT
Start: 2025-09-03 | End: 2025-09-03 | Stop reason: SDUPTHER

## 2025-09-03 RX ORDER — HYDROCODONE POLISTIREX AND CHLORPHENIRAMINE POLISTIREX 10; 8 MG/5ML; MG/5ML
5 SUSPENSION, EXTENDED RELEASE ORAL EVERY 12 HOURS PRN
Qty: 100 ML | Refills: 0 | Status: SHIPPED | OUTPATIENT
Start: 2025-09-03 | End: 2025-09-13

## (undated) DEVICE — SYR 10ML LUER LOK 1/5ML GRAD --

## (undated) DEVICE — SYRINGE MED 25GA 3ML L5/8IN SUBQ PLAS W/ DETACH NDL SFTY

## (undated) DEVICE — FLUFF AND POLYMER UNDERPAD,EXTRA HEAVY: Brand: WINGS

## (undated) DEVICE — MEDI-VAC NON-CONDUCTIVE SUCTION TUBING: Brand: CARDINAL HEALTH

## (undated) DEVICE — SNARE ENDOSCP POLYP 2.4 MM 240 CM 10 MM 2.8 MM CAPTIVATOR

## (undated) DEVICE — GOWN ISOL IMPERV UNIV, DISP, OPEN BACK, BLUE --

## (undated) DEVICE — TRAP SPEC COLL POLYP POLYSTYR --

## (undated) DEVICE — CATHETER SUCT TR FL TIP 14FR W/ O CTRL

## (undated) DEVICE — ENDOSCOPY PUMP TUBING/ CAP SET: Brand: ERBE

## (undated) DEVICE — YANKAUER,SMOOTH HANDLE,HIGH CAPACITY: Brand: MEDLINE INDUSTRIES, INC.

## (undated) DEVICE — SYR 20ML LL STRL LF --

## (undated) DEVICE — SOLUTION IRRIG 1000ML H2O STRL BLT

## (undated) DEVICE — SYR 50ML SLIP TIP NSAF LF STRL --

## (undated) DEVICE — LINER SUCT CANSTR 3000CC PLAS SFT PRE ASSEMB W/OUT TBNG W/

## (undated) DEVICE — CANNULA ORIG TL CLR W FOAM CUSHIONS AND 14FT SUPL TB 3 CHN

## (undated) DEVICE — CANNULA NSL AD TBNG L14FT STD PVC O2 CRV CONN NONFLARED NSL

## (undated) DEVICE — GAUZE,SPONGE,4"X4",16PLY,STRL,LF,10/TRAY: Brand: MEDLINE